# Patient Record
Sex: MALE | Race: WHITE | NOT HISPANIC OR LATINO | Employment: FULL TIME | URBAN - METROPOLITAN AREA
[De-identification: names, ages, dates, MRNs, and addresses within clinical notes are randomized per-mention and may not be internally consistent; named-entity substitution may affect disease eponyms.]

---

## 2017-09-14 DIAGNOSIS — I49.9 CARDIAC ARRHYTHMIA, UNSPECIFIED CARDIAC ARRHYTHMIA TYPE: Primary | ICD-10-CM

## 2017-09-15 ENCOUNTER — OFFICE VISIT (OUTPATIENT)
Dept: CARDIOLOGY | Facility: CLINIC | Age: 47
End: 2017-09-15
Payer: COMMERCIAL

## 2017-09-15 ENCOUNTER — CLINICAL SUPPORT (OUTPATIENT)
Dept: CARDIOLOGY | Facility: CLINIC | Age: 47
End: 2017-09-15
Payer: COMMERCIAL

## 2017-09-15 VITALS
HEART RATE: 96 BPM | SYSTOLIC BLOOD PRESSURE: 140 MMHG | HEIGHT: 71 IN | BODY MASS INDEX: 34.16 KG/M2 | DIASTOLIC BLOOD PRESSURE: 84 MMHG | WEIGHT: 244 LBS

## 2017-09-15 DIAGNOSIS — E66.9 OBESITY (BMI 30.0-34.9): ICD-10-CM

## 2017-09-15 DIAGNOSIS — I48.0 PAF (PAROXYSMAL ATRIAL FIBRILLATION): Primary | ICD-10-CM

## 2017-09-15 DIAGNOSIS — I10 ESSENTIAL HYPERTENSION: ICD-10-CM

## 2017-09-15 DIAGNOSIS — I49.9 CARDIAC ARRHYTHMIA, UNSPECIFIED CARDIAC ARRHYTHMIA TYPE: ICD-10-CM

## 2017-09-15 DIAGNOSIS — F41.9 ANXIETY: ICD-10-CM

## 2017-09-15 PROCEDURE — 93000 ELECTROCARDIOGRAM COMPLETE: CPT | Mod: S$GLB,,, | Performed by: INTERNAL MEDICINE

## 2017-09-15 PROCEDURE — 99999 PR PBB SHADOW E&M-EST. PATIENT-LVL III: CPT | Mod: PBBFAC,,, | Performed by: INTERNAL MEDICINE

## 2017-09-15 PROCEDURE — 3008F BODY MASS INDEX DOCD: CPT | Mod: S$GLB,,, | Performed by: INTERNAL MEDICINE

## 2017-09-15 PROCEDURE — 99205 OFFICE O/P NEW HI 60 MIN: CPT | Mod: S$GLB,,, | Performed by: INTERNAL MEDICINE

## 2017-09-15 RX ORDER — BISOPROLOL FUMARATE AND HYDROCHLOROTHIAZIDE 2.5; 6.25 MG/1; MG/1
1 TABLET ORAL DAILY
Qty: 30 TABLET | Refills: 11 | Status: SHIPPED | OUTPATIENT
Start: 2017-09-15 | End: 2018-07-04 | Stop reason: SDUPTHER

## 2017-09-15 RX ORDER — DILTIAZEM HYDROCHLORIDE 240 MG/1
240 CAPSULE, COATED, EXTENDED RELEASE ORAL DAILY
COMMUNITY
End: 2017-09-15 | Stop reason: SINTOL

## 2017-09-15 NOTE — PROGRESS NOTES
Subjective:   Patient ID:  Gino Arreola is a 47 y.o. male     Chief complaint:Atrial Fibrillation (establish EP- PCP changed Atenolol to Cardizem due to ED and does not like side affects of Cardizem)      HPI  Background :  New patient to me   Self referred   47 man   Had one bout of PAF 10 years ago- self converted. Was placed on atenolol. No issues since then. No Hx prior alcohol intake -- drinks a couple beers a week. At the time was in great athletic shape. Now still gets 73290 steps a day.   He has had a few flutters for a few secs in the past few years  He moved from [place] recently. He has started having ED on atenolol and stopped it early August-- better. He was seen by MD and placed on Diltiazem and his  BP has been high. BP was not Hi on atenolol. He feels poorly on the diltiazem -- he feels as though his heart beat is harder and her feels as though his chest is full of gas.   I have reviewed the actual image of the ECG tracing obtained today and it shows NSR with normal intervals    Current Outpatient Prescriptions   Medication Sig    bisoprolol-hydrochlorothiazide 2.5-6.25 mg (ZIAC) 2.5-6.25 mg Tab Take 1 tablet by mouth once daily.     No current facility-administered medications for this visit.      Review of Systems   Constitution: Negative for decreased appetite, weakness, malaise/fatigue, weight gain and weight loss.   Eyes: Negative for blurred vision.   Cardiovascular: Negative for chest pain, claudication, cyanosis, dyspnea on exertion, irregular heartbeat, leg swelling, near-syncope, orthopnea and palpitations.   Respiratory: Negative for cough, shortness of breath, sleep disturbances due to breathing, snoring and wheezing.    Endocrine: Negative for heat intolerance.   Hematologic/Lymphatic: Does not bruise/bleed easily.   Musculoskeletal: Negative for muscle weakness and myalgias.   Gastrointestinal: Positive for bloating. Negative for melena, nausea and vomiting.   Genitourinary:  Negative for nocturia.   Neurological: Negative for excessive daytime sleepiness, dizziness, headaches and light-headedness.   Psychiatric/Behavioral: Negative for depression, memory loss and substance abuse. The patient does not have insomnia and is not nervous/anxious.        Objective:   Physical Exam   Constitutional: He is oriented to person, place, and time. He appears well-developed and well-nourished.   HENT:   Head: Normocephalic and atraumatic.   Right Ear: External ear normal.   Left Ear: External ear normal.   Eyes: Conjunctivae are normal. Pupils are equal, round, and reactive to light. Right eye exhibits no discharge. Left eye exhibits no discharge. Right conjunctiva is not injected. Left conjunctiva has no hemorrhage.   Neck: Neck supple. No JVD present. No thyromegaly present.   Cardiovascular: Normal rate, regular rhythm, normal heart sounds and intact distal pulses.  PMI is not displaced.  Exam reveals no gallop, no friction rub, no midsystolic click and no opening snap.    No murmur heard.  Pulses:       Carotid pulses are 2+ on the right side, and 2+ on the left side.       Radial pulses are 2+ on the right side, and 2+ on the left side.        Dorsalis pedis pulses are 2+ on the right side, and 2+ on the left side.        Posterior tibial pulses are 2+ on the right side, and 2+ on the left side.   Pulmonary/Chest: Effort normal and breath sounds normal. No respiratory distress. He has no wheezes. He has no rales. He exhibits no tenderness.   Abdominal: Soft. Normal appearance. He exhibits no pulsatile liver. There is no hepatomegaly. There is no tenderness. There is no rebound and no guarding.   Musculoskeletal: Normal range of motion. He exhibits no edema or tenderness.        Right knee: He exhibits no swelling.        Left knee: He exhibits no swelling.        Right ankle: He exhibits no swelling.        Left ankle: He exhibits no swelling.        Right lower leg: He exhibits no swelling.      "   Left lower leg: He exhibits no swelling.        Right foot: There is no swelling.        Left foot: There is no swelling.   Neurological: He is alert and oriented to person, place, and time. He has normal strength and normal reflexes. No cranial nerve deficit. Coordination normal.   Skin: Skin is warm, dry and intact. No rash noted. He is not diaphoretic. No cyanosis.   Psychiatric: He has a normal mood and affect. His behavior is normal.   Nursing note and vitals reviewed.    BP (!) 140/84 (BP Location: Right arm, Patient Position: Sitting)   Pulse 96   Ht 5' 11" (1.803 m)   Wt 110.7 kg (244 lb)   BMI 34.03 kg/m²      Assessment:      1. PAF (paroxysmal atrial fibrillation)    2. Essential hypertension    3. Obesity (BMI 30.0-34.9)    4. Anxiety        Plan:     The patient and I have had a long talk about A Fib, its causes, its clinical manifestations, the electrical and hemodynamic reasons underlying the symptoms it causes, its prognostic implications, its prevalence, its temporal manifestations and its currently available treatment options.     As to causes,         we talked about the triad of obesity, sleep apnea and hypertension as being the most common underpinnings of non valvular AF. To this one should add alcohol consumption, hyperthyroidism and major stressors such as acute illnesses and post operative states as potential significant but usually reversible triggers.    Other important associated factors include aging, SSS and at times a familial/genetic tendency (the latter is invoked in younger people).  Of course, patients with reduced heart function and/or valvular disease (especially mitral regurgitation) are more prone to developing atrial fibrillation. Furthermore, atrial fibrillation and these conditions seem to promote each other in a vicious cycle like pattern (AFib worsens heart failure and MR which in turn promote more A Fib etc).     As to clinical manifestations,        we discussed " the fact that some patients can present with severe symptoms including palpitations (fast and/or irregular), SOB, LR and michael heart failure, dizziness, near syncope and rarely syncope. Others may have milder Sx such as mild intermittent palpitations, fatigue, generalized malaise, sleep disturbances, and simply a poorly defined decrease in well being (including a depressed mood, a decrease in mental agility and maybe an increase in irritability etc). Others yet are apparently completely asymptomatic and are often surprised to note that their pulse is irregular when they self check it or to be told that their ECG shows A Fib. This group is said to comprise approximately 27% of all patients with A Fib ([place] study data), but upon closer scrutiny many so called asymptomatic patients find that indeed they had some A Fib related ill defined Sx that were affecting their lifestyles a lot more than they had suspected (generally, these Sx would be ascribed to being deconditioned, getting old etc but they seem to be reversed once AFib is reverted to normal sinus rhythm (NSR)).    As to why A Fib cause symptoms,       we discussed how the normal heart beat is engineered. To wit, the heart is a contractile pump that cycles through a filling (rest) period followed by a squeezing (active) period. These two consecutive periods form one cardiac cycle that gets continuously repeated. To start a cycle, the pump needs to be electrically supplied/charged. This happens with the help of the sinus node that sets the heart rhythm that is designed to coordinate the smooth transfer of blood flow between the various heart chambers and their attached vessels.  The heart has 4 cardiac chambers: two upper chambers called the right and left atria and two lower chambers called the right and left ventricles. The upper chambers are connected to large veins that carry blood back for cardiac filling. The right upper chamber (right atrium) is  "connected to two veins called vena cava. They carry the de-oxygenated blood back from the lower and upper body to the right atrium (the superior vena cava receives blood back from the head and neck and upper torso including the arms; the inferior vena cava receives blood from the lower body). The L atrium receives re-oxygenated blood from the lungs via four pulmonary veins(PV)  (two R and two L). As we will see later, these PVs are integrally responsible for the development of most A fib events.   The sinus node is located in the area near the attachment of the superior vena cava (SVC) to the right atrium (RA). It is an electrically automatic structure that builds up a charge and releases it after reaching a certain voltage (not unlike a capacitor). This electrical impulse will then be carried by electrical wires to the R and L atria. The signal will then be distributed through a myriad of small connections to the individual muscle cells within the atria. This is a rapid process that will cause the muscles of the upper chambers to contract and therefore squeeze down their internal volumes. As the size of the chambers gets reduced, the internal pressure within their cavity is increased. This is in turn will open the valves that separate the atria from the ventricles (the AV valves: tricuspid valve on the right, mitral valve on the left). Thus starts the filling of the ventricles. This is mostly a passive "rushing" process. When filling of the ventricles becomes substantial enough the pressure amrit within their cavities and the atria then actively squeeze to empty the rest of the blood they contain (this active "atrial kick" is generally responsible for 15 to 25% of ventricular filling but could become a much more important contributor in certain pathological conditions such as diastolic heart failure etc). Once the atrial kick is complete and the atria are virtually empty, the pressure in the ventricles becomes " high enough that the AV valves close up thus sealing the atria from the subsequent ventricular contraction.  As one can suspect, all of this mechanical activity takes a much longer time than the electrical impulse that set it off. Since the ventricular contraction also requires similar electrical activation there needs to be a system of electro-mechanical coupling. This is insured by the AV node which acts as a relay station, a sort of filter that holds on to the electrical impulse just long enough for the atrial contraction to be complete. The electrical impulse is then released into electrical cables (the R and L bundle branches) that in turn deliver the signals to the individual myocardial cells. This sets up the ventricular squeeze and the R ventricle (RV) pushes the blood through the pulmonary artery (PA) into the lungs for re-oxygenation while the L ventricle (LV) pushes the blood into the aorta to be distributed to the rest of the body where the oxygen gets used by the various organs. Once the ventricular systole is complete, the ventricles then start the process of relaxation and their internal pressure drops setting the stage  For a new full cardiac cycle.  This orderly process of filling and emptying, relaxation and contraction is intimately related to the function of the sinus node. In turn the sinus node is controlled by nerve impulses that allow it to speed up or slow down its repetition rate depending on needs (faster heart rates when a person is active, slower rates when sleeping etc).       When A fib occurs, it shuts off the function of the sinus node. Unfortunately, in the presence of A Fib, there are very many (up to 400) small irregular impulse that flow around, bump and compete for conduction across the AV node. Fortunately, only some of these impulse will make through to the ventricles. Nevertheless, this results in an irregular ventricular heart beat that is at times very fast. The fast heart  "beat does not allow the heart enough time to relax and fill and therefore the blood distribution to the body is impaired and the patient may feel palpitations, dizziness, weakness and SOB among other symptoms. The irregular heart beat further impairs the mechanics of the heart squeeze and may increase leaks across the AV valves. Finally, because the atrial electrical signals are many, there is no organized atrial contraction so that filling of the lower chambers is only a passive "sucking" process as the atrial kick cannot occur anymore. As A Fib sets in, the atrial muscle tires, and eventually weakens causing the atrial chambers to gradually enlarge and the blood within them to become more and more stagnant especially in some dependent areas (such as the left atrial appendage- NAY).     The stagnation of blood tends to form blood clots in the NAY (a finger like extension of the LA that has muscular areas and is located in a dependent position). If and when these blood clots get detached from the NAY, they travel into the circulation and may plug up some important arterial blood vessel resulting in organ damage (such as strokes, heart attacks, limb clots etc).    As to prognosis,       due to excess stroke risk and cardiovascular stresses,AF  has been implicated in an overall reduction of expected life span by an estimated average two to three years.  Nevertheless, many individuals can live long, happy and productive lives while in persistent atrial fibrillation. There are in fact an estimated five million people in the [place] alone who have some form of atrial fibrillation.    As to its types and temporal manifestations,       A Fib, is labeled as either paroxysmal (PAF) or persistent Pers AF) or permanent (perm AF).          PAF starts suddenly and stops on its own, usually within less than a week.          Pers AF stays longer than a week but is reversible by medical intervention.             Of course both PAF and " Pers AF can be recurrent and may require repetitive medical interventions.         Perm AF is AF that cannot be converted back to NSR for any meaningful period of time despite any and all medical interventions.             Of course, this definition changes with time as medical advances keep adding newer and more powerful tools designed to keep AF at bay.    As to treatment, there are two related but somewhat independent pathways that need to be followed simultaneously.      First and foremost, one needs to manage and attenuate the risks of stroke that are attendant to AF. This is done largely through the use of blood thinning medications (anticoagulants) namely warfarin (coumadin) or the newer, easier to use, generally more effective and safer (but more expensive) agents. These are Dabigatran (Pradaxa), Rivaroxiban (Xarelto), Apixaban (Eliquis) and Edoxaban (Savaysa). Unfortunately Aspirin alone is ineffective while the combination of Aspirin and Clopidogrel (Plavix) provides at most mild protection (less than warfarin) and does not significantly decrease the risk of associated bleeding.  There are also mechanical solutions that involve occlusion or ligation of the NAY but these are reserved to special cases.   The anticoagulation decisions are made as soon as one is diagnosed with AFib whether it reverts back to NSR or not. In general,, once a decision to give long term anticoagulation is made it is not modified by the specifics of the rhythm treatment of AFib.     Second, one needs to minimize the impact of AF on cardiovascular hemodynamics and function.      There are two approaches to doing that:          One approach, called the rate control strategy, is designed to simply increasing the filtering across the AV node thus slowing the heart rate. This strategy, when combined with effective anticoagulation, is statistically effective in preserving patients' overall health and longevity but is not satisfactory to  everyone. Generally, the following medications are used: Beta Blockers (there are many), Diltiazem and Verapamil. In addition , Digoxin (Lanoxin) can sometimes be useful.   With this strategy, the patient will remain in AF and although the heart rate is slower it remains irregular (so palpitations may persist although more serious symptoms such as SOB, LR and CHF may herbert).  When rate control is not effective despite optimal medical treatment, one can resort to placing a pacemaker and following up with ablation of the AV node. This effectively stops the flow of all electrical activity from the atria to the ventricles.         The second approach, called the rhythm control strategy is designed to restore the NSR. This is theoretically ideal. Unfortunately it can at times be a more difficult task and often relies on more intensive medical and procedural interventions including more powerful (and potentially toxic) medications than the ones used for rate control alone.              The most benign and helpful immediate intervention to terminate AF is electrical cardioversion (DCCV). When well prepared for (appropriate anticoagulation) and well executed (appropriate sedation) this is a very easy, safe and effective procedure that restores sinus rhythm in more than 90% of instances. The weakness of DCCV is that it provides temporary relief and other factors and or treatment measures have to be conjured to keep AF at bay for long periods of time. On the positive side, DCCV can be used repeatedly with seemingly little downside except for the practical disruptions to one's schedule and perhaps eventual futility.             To maintain NSR once initial DCCV fails to provide long term relief, antiarrhythmic drugs (AA Rx) are prescribed. These include type 1A drugs (Quinidine and disopyramide), type 1Cdrugs (flecainide and propafenone) and type III drugs (amiodarone, sotalol, amiodarone, dronedarone and dofetilide). In  addition, Ranozalin, a drug that is approved for angina is sometimes used as an adjunct with amiodarone or dronedarone. Usually, the type 1A agents as well as dofetilide and perhaps sotalol require in hospital initiation to monitor their effects and/or side effects. The other agents are generally started on an outpatient basis. These medications are powerful and require follow up by a cardiology specialist, preferably an electrophysiologist.              When drugs are not effective, poorly tolerated or simply not wanted, ablative procedures can be used. The general goal of ablation is to electrically isolate the pulmonary veins (PVs). In A Fib, the PVs harbor abnormal triggering signals that are responsible for initiation of AF. PV isolation silences or isolates these triggers.                    This can be done either surgically (the MAZE procedure) or via catheter ablation whereby catheters are placed through the veins in the groin and across the interatrial membrane. These are then guided to the pulmonary veins and cauterizing lesions are placed circumferentially outside the PV orifices. Two types of energy are commonly used: radiofrequency energy (hot cautery)  is generally applied point by point via a 3.5 mm tipped catheter while cryoablation (cold cautery) is generally applied by insuflating cold nitrogen into a  balloon expanded against the PV orifice.                    Both catheter mediated techniques have similar risks and success rates. The complication rate is rather low but the potential hazards can lead to serious injury (including but not limited to death, cardiac or great vessel perforation possibly requiring chest surgery, esophageal injury, PV narrowing, injury to the phrenic nerves - the nerves that innervate the diaphragms- , complications anesthesia, kidney injury, heart attack, heart failure and stroke).                     In addition to these downstream (reactive) treatment regimens, one  "needs to also offer "upstream"(preventive) treatments.        These include steps taken to achieve strict control of the conditions that we listed above as contributory causes to AF.       Thus, weight control (including bariatric surgery), sleep apnea management (CPAP etc), abstinence from alcohol intake, BP control, treatment of hyperthyroidism (tapazole, radio-iodine ablation, thyroidectomy), management of heart failure and valvular disease (including surgical correction) are all essential to enhance the effectiveness of AF treatment and control of recurrences.        At this stage, there is no need for any reactive treatment, just upstream preventive Rx -- meaning, control HTN, keep wt at healthy levels, exerc ise moderately, keep alcohol intake to no more than one drink a day.   We will DC Cardizem (side effects) and try a small dose of Ziac for the Rx of HTN -- if has side effects then switch to Eplerenone    Orders Placed This Encounter   Procedures    Basic metabolic panel     Standing Status:   Future     Standing Expiration Date:   9/15/2018    MAGNESIUM     Standing Status:   Future     Standing Expiration Date:   11/14/2018     Return in about 1 year (around 9/15/2018), or if symptoms worsen or fail to improve.  Medications Discontinued During This Encounter   Medication Reason    diltiaZEM (CARDIZEM CD) 240 MG 24 hr capsule Side effects     New Prescriptions    BISOPROLOL-HYDROCHLOROTHIAZIDE 2.5-6.25 MG (ZIAC) 2.5-6.25 MG TAB    Take 1 tablet by mouth once daily.     Modified Medications    No medications on file              "

## 2017-09-23 PROBLEM — I48.0 PAF (PAROXYSMAL ATRIAL FIBRILLATION): Status: ACTIVE | Noted: 2017-09-23

## 2017-09-23 PROBLEM — F41.9 ANXIETY: Status: ACTIVE | Noted: 2017-09-23

## 2017-09-23 PROBLEM — E66.9 OBESITY (BMI 30.0-34.9): Status: ACTIVE | Noted: 2017-09-23

## 2017-09-27 ENCOUNTER — LAB VISIT (OUTPATIENT)
Dept: LAB | Facility: HOSPITAL | Age: 47
End: 2017-09-27
Attending: INTERNAL MEDICINE
Payer: COMMERCIAL

## 2017-09-27 DIAGNOSIS — I10 ESSENTIAL HYPERTENSION: ICD-10-CM

## 2017-09-27 LAB
ANION GAP SERPL CALC-SCNC: 9 MMOL/L
BUN SERPL-MCNC: 16 MG/DL
CALCIUM SERPL-MCNC: 9.3 MG/DL
CHLORIDE SERPL-SCNC: 103 MMOL/L
CO2 SERPL-SCNC: 26 MMOL/L
CREAT SERPL-MCNC: 1 MG/DL
EST. GFR  (AFRICAN AMERICAN): >60 ML/MIN/1.73 M^2
EST. GFR  (NON AFRICAN AMERICAN): >60 ML/MIN/1.73 M^2
GLUCOSE SERPL-MCNC: 92 MG/DL
MAGNESIUM SERPL-MCNC: 2.1 MG/DL
POTASSIUM SERPL-SCNC: 3.9 MMOL/L
SODIUM SERPL-SCNC: 138 MMOL/L

## 2017-09-27 PROCEDURE — 36415 COLL VENOUS BLD VENIPUNCTURE: CPT | Mod: PO

## 2017-09-27 PROCEDURE — 83735 ASSAY OF MAGNESIUM: CPT

## 2017-09-27 PROCEDURE — 80048 BASIC METABOLIC PNL TOTAL CA: CPT

## 2017-10-02 ENCOUNTER — TELEPHONE (OUTPATIENT)
Dept: ELECTROPHYSIOLOGY | Facility: CLINIC | Age: 47
End: 2017-10-02

## 2017-10-02 NOTE — TELEPHONE ENCOUNTER
----- Message from Thad Hamilton MD sent at 10/2/2017  6:34 AM CDT -----  All results are OK. Please see comments below- if any- and call patient.  In general you do not need to route back to me.

## 2018-01-10 NOTE — RESULT NOTES
Verified Results  [facility]) CMP14+eGFR [id number] 09:07AM Austin Fend     Test Name Result Flag Reference   Glucose, Serum 105 mg/dL H 65-99   BUN 18 mg/dL  6-24   Creatinine, Serum 0 95 mg/dL  0 76-1 27   eGFR If NonAfricn Am 96 mL/min/1 73  >59   eGFR If Africn Am 111 mL/min/1 73  >59   BUN/Creatinine Ratio 19  9-20   Sodium, Serum 138 mmol/L  134-144   Potassium, Serum 4 6 mmol/L  3 5-5 2   Chloride, Serum 100 mmol/L     Carbon Dioxide, Total 21 mmol/L  18-29   Calcium, Serum 9 4 mg/dL  8 7-10 2   Protein, Total, Serum 7 0 g/dL  6 0-8 5   Albumin, Serum 4 5 g/dL  3 5-5 5   Globulin, Total 2 5 g/dL  1 5-4 5   A/G Ratio 1 8  1 1-2 5   Bilirubin, Total 0 6 mg/dL  0 0-1 2   Alkaline Phosphatase, S 65 IU/L     AST (SGOT) 23 IU/L  0-40   ALT (SGPT) 29 IU/L  0-44     (LC) Lipid Panel With LDL/HDL Ratio [id number] 09:07AM Jacky Fend     Test Name Result Flag Reference   Cholesterol, Total 226 mg/dL H 100-199   Triglycerides 218 mg/dL H 0-149   HDL Cholesterol 42 mg/dL  >39   According to ATP-III Guidelines, HDL-C >59 mg/dL is considered a  negative risk factor for CHD  VLDL Cholesterol Umberto 44 mg/dL H 5-40   LDL Cholesterol Calc 140 mg/dL H 0-99   LDL/HDL Ratio 3 3 ratio units  0 0-3 6   LDL/HDL Ratio                                                             Men  Women                                               1/2 Avg  Risk  1 0    1 5                                                   Avg Risk  3 6    3 2                                                2X Avg  Risk  6 2    5 0                                                3X Avg  Risk  8 0    6 1     (LC) PSA Total (Reflex To Free) [id number] 09:07AM Austin Fend     Test Name Result Flag Reference   Prostate Specific Ag, Serum 0 5 ng/mL  0 0-4 0   Roche ECLIA methodology  According to the American Urological Association, Serum PSA should  decrease and remain at undetectable levels after radical  prostatectomy   The AUA defines biochemical recurrence as an initial  PSA value 0 2 ng/mL or greater followed by a subsequent confirmatory  PSA value 0 2 ng/mL or greater  Values obtained with different assay methods or kits cannot be used  interchangeably  Results cannot be interpreted as absolute evidence  of the presence or absence of malignant disease  Reflex Criteria Comment     The percent free PSA is performed on a reflex basis only when the  total PSA is between 4 0 and 10 0 ng/mL       [facility]) TSH+Free T4 [id number] 09:07AM Dom Citizen     Test Name Result Flag Reference   TSH 1 770 uIU/mL  0 450-4 500   T4,Free(Direct) 1 38 ng/dL  0 82-1 77       Discussion/Summary   Will discuss labs at follow up appt

## 2018-01-11 NOTE — RESULT NOTES
Verified Results  [facility]) CBC+Platelet+Hem Review [id number] 09:07AM [facility]     Test Name Result Flag Reference   WBC 5 5 x10E3/uL  3 4-10 8   RBC 5 12 x10E6/uL  4 14-5 80   Hemoglobin 15 5 g/dL  12 6-17 7   Hematocrit 45 1 %  37 5-51 0   MCV 88 fL  79-97   MCH 30 3 pg  26 6-33 0   MCHC 34 4 g/dL  31 5-35 7   RDW 13 3 %  12 3-15 4   Platelets 594 O48G9/VV  150-379   Neutrophils 66 %     Lymphs 28 %     Monocytes 4 %     Eos 2 %     Basos 0 %     Neutrophils Absolute 3 6 X10E3/uL  1 4-7 0   Lymphs (Absolute) 1 5 X10E3/uL  0 7-3 1   Monocytes(Absolute) 0 2 X10E3/uL  0 1-0 9   Eos (Absolute Value) 0 1 X10E3/uL  0 0-0 4   Baso(Absolute) 0 0 X10E3/uL  0 0-0 2   RBC Comment RBC's appear normal   Normal   Platelet Comment Comment  Adequate   Platelet count verified by examination of peripheral blood smear  [facility]) Cardiovascular Risk Assessment [id number] 09:07AM [facility]     Test Name Result Flag Reference   Interpretation Note     Supplement report is available  PDF Image            Discussion/Summary   lab acceptable
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
## 2018-07-05 RX ORDER — BISOPROLOL FUMARATE AND HYDROCHLOROTHIAZIDE 2.5; 6.25 MG/1; MG/1
TABLET ORAL
Qty: 30 TABLET | Refills: 3 | Status: SHIPPED | OUTPATIENT
Start: 2018-07-05 | End: 2018-11-07 | Stop reason: SDUPTHER

## 2018-11-07 RX ORDER — BISOPROLOL FUMARATE AND HYDROCHLOROTHIAZIDE 2.5; 6.25 MG/1; MG/1
TABLET ORAL
Qty: 30 TABLET | Refills: 0 | Status: SHIPPED | OUTPATIENT
Start: 2018-11-07 | End: 2018-12-12 | Stop reason: SDUPTHER

## 2018-12-13 RX ORDER — BISOPROLOL FUMARATE AND HYDROCHLOROTHIAZIDE 2.5; 6.25 MG/1; MG/1
TABLET ORAL
Qty: 30 TABLET | Refills: 0 | Status: SHIPPED | OUTPATIENT
Start: 2018-12-13 | End: 2019-01-04 | Stop reason: SDUPTHER

## 2019-01-04 DIAGNOSIS — I48.0 PAF (PAROXYSMAL ATRIAL FIBRILLATION): Primary | ICD-10-CM

## 2019-01-06 RX ORDER — BISOPROLOL FUMARATE AND HYDROCHLOROTHIAZIDE 2.5; 6.25 MG/1; MG/1
1 TABLET ORAL DAILY
Qty: 90 TABLET | Refills: 4 | Status: SHIPPED | OUTPATIENT
Start: 2019-01-06

## 2022-09-09 ENCOUNTER — OFFICE VISIT (OUTPATIENT)
Dept: CARDIOLOGY CLINIC | Facility: CLINIC | Age: 52
End: 2022-09-09
Payer: COMMERCIAL

## 2022-09-09 VITALS
OXYGEN SATURATION: 97 % | HEART RATE: 116 BPM | HEIGHT: 72 IN | DIASTOLIC BLOOD PRESSURE: 60 MMHG | WEIGHT: 243 LBS | TEMPERATURE: 97 F | SYSTOLIC BLOOD PRESSURE: 120 MMHG | BODY MASS INDEX: 32.91 KG/M2

## 2022-09-09 DIAGNOSIS — Z98.890 HISTORY OF CARDIAC RADIOFREQUENCY ABLATION: ICD-10-CM

## 2022-09-09 DIAGNOSIS — I10 HYPERTENSION, UNSPECIFIED TYPE: ICD-10-CM

## 2022-09-09 DIAGNOSIS — R06.83 SNORING: ICD-10-CM

## 2022-09-09 DIAGNOSIS — E78.2 MIXED HYPERLIPIDEMIA: ICD-10-CM

## 2022-09-09 DIAGNOSIS — E66.9 OBESITY (BMI 30-39.9): ICD-10-CM

## 2022-09-09 DIAGNOSIS — I48.0 PAROXYSMAL ATRIAL FIBRILLATION (HCC): ICD-10-CM

## 2022-09-09 PROCEDURE — 93000 ELECTROCARDIOGRAM COMPLETE: CPT | Performed by: INTERNAL MEDICINE

## 2022-09-09 PROCEDURE — 99214 OFFICE O/P EST MOD 30 MIN: CPT | Performed by: INTERNAL MEDICINE

## 2022-09-09 RX ORDER — SILDENAFIL 100 MG/1
100 TABLET, FILM COATED ORAL DAILY PRN
COMMUNITY

## 2022-09-09 RX ORDER — METOPROLOL TARTRATE 50 MG/1
50 TABLET, FILM COATED ORAL EVERY 12 HOURS SCHEDULED
COMMUNITY
End: 2022-09-26 | Stop reason: SDUPTHER

## 2022-09-09 RX ORDER — OLMESARTAN MEDOXOMIL 40 MG/1
40 TABLET ORAL DAILY
COMMUNITY

## 2022-09-09 NOTE — PROGRESS NOTES
Consultation - Cardiology Office  [facility]  Krista Velázquez 46 y o  male MRN: [id number]  : 1970  Unit/Bed#:  Encounter: [id number]      Assessment:     1  Paroxysmal atrial fibrillation (HCC)    2  Hypertension, unspecified type    3  Mixed hyperlipidemia    4  Obesity (BMI 30-39 9)    5  History of atrial fibrillation ablation on 2021    6  Snoring        Discussion summary and Plan:    1  Paroxysmal atrial fibrillation  Currently he is in sinus tachycardia heart rate now has come down to around 90 beats per minute  When he came he was running to our office as he was late as per him  Continue beta-blocker advised him to be compliant with it  Continue Xarelto for antithrombotic therapy  2  Hypertension  Seems to be well controlled with Benicar    3  Dyslipidemia  To better assess his risk for cardiovascular event advised to get yearly cholesterol test     4  Obesity with BMI around 33 and he had atrial fibrillation and snoring he will need a sleep study to rule out sleep apnea    5  Snoring with atrial arrhythmias  Sleep study ordered  Patient will get initially home study  6  Erectile dysfunction  On Viagra as needed    7  Sinus tachycardia on EKG  Most likely due to underlying not taking beta-blocker and patient rushing to our office  Advised him to monitor heart rate if heart rate still elevated more than 100 by tomorrow he will give us a call  He monitors his heart rate by Apple watch regularly  Patient / Melissa Tellezw was advised and educated to call our office  immediately if  patient has any new symptoms of chest pain/shortness of breath, near-syncope, syncope, light headedness sustained palpitations or any other cardiovascular symptoms before their scheduled follow-up appointment  Office number was provided #[phone]  Thank you for your consultation    If you have any question please call me at [phone]    Counseling :  A description of the counseling  Goals and Barriers  Patient's ability to self care: Yes  Medication side effect reviewed with patient in detail and all their questions answered to their satisfaction  Primary Care Physician Requesting Consult: Alissa Lane DO    Reason for Consult / Principal Problem:  Paroxysmal atrial fibrillation and history of hypertension  HPI :     Timothy Gonzales is a 46y o  year old male who was referred by primary care doctor for history of paroxysmal atrial fibrillation hypertension  Patient used to live in [place] and had episode of atrial fibrillation at that time which was suppressed with flecainide he [place] to [place] [address] [address] and started to get later on multiple episodes of atrial fibrillation  He was evaluated by EP in  [facility] and he had EP workup done in the form of ablation in September 2021  Since then he is maintaining sinus rhythm  Occasionally still get episodes of fast heartbeat  He has been on Xarelto and metoprolol 50 twice a day  He was recommended sleep study he has not done it yet  He did develop erectile dysfunction with beta-blocker has been taking Viagra as needed  Sometime he misses beta-blocker  Today he was rushing to our office his heart rate on arrival was found to be elevated around 160 beats per minute  His previous cardiac workup reviewed  And note from EP and other note reviewed  He had normal LV systolic function  He had a previously stress echo which was normal and CTA for coronary was normal   His palpitations which he gets occasionally was thought to be PACs and PVCs and they have ordered monitor for him which he was done recently  He denies any nausea and vomiting he does snore  He does not drink much  No other cardiovascular issues  Review of Systems   Constitutional: Negative for activity change, chills, diaphoresis, fever and unexpected weight change  HENT: Negative for congestion  Eyes: Negative for discharge and redness  Respiratory: Negative for cough, chest tightness, shortness of breath and wheezing  Cardiovascular: Positive for palpitations  Negative for chest pain and leg swelling  Gastrointestinal: Negative for abdominal pain, diarrhea and nausea  Endocrine: Negative  Genitourinary: Negative for decreased urine volume and urgency  Musculoskeletal: Negative  Negative for arthralgias, back pain and gait problem  Skin: Negative for rash and wound  Allergic/Immunologic: Negative  Neurological: Negative for dizziness, seizures, syncope, weakness, light-headedness and headaches  Hematological: Negative  Psychiatric/Behavioral: Positive for sleep disturbance  Negative for agitation and confusion  The patient is not nervous/anxious          Historical Information   Past Medical History:   Diagnosis Date    Atrial fibrillation ([facility] [address] )     HTN (hypertension)      Past Surgical History:   Procedure Laterality Date    CARDIAC ELECTROPHYSIOLOGY STUDY AND ABLATION      CLAVICLE SURGERY Left      Social History     Substance and Sexual Activity   Alcohol Use Yes    Alcohol/week: 1 0 standard drink    Types: 1 Cans of beer per week     Social History     Substance and Sexual Activity   Drug Use Never     Social History     Tobacco Use   Smoking Status Former Smoker    Quit date: 200    Years since quittin 7   Smokeless Tobacco Never Used     Family History:   Family History   Problem Relation Age of Onset    Atrial fibrillation Father        Meds/Allergies     No Known Allergies    Current Outpatient Medications:     metoprolol tartrate (LOPRESSOR) 50 mg tablet, Take 50 mg by mouth every 12 (twelve) hours, Disp: , Rfl:     olmesartan (BENICAR) 40 mg tablet, Take 40 mg by mouth daily, Disp: , Rfl:     rivaroxaban (Xarelto) 20 mg tablet, Take 20 mg by mouth, Disp: , Rfl:     sildenafil (VIAGRA) 100 mg tablet, Take 100 mg by mouth daily as needed for erectile dysfunction, Disp: , Rfl:     Vitals: Blood pressure 120/60, pulse (!) 116, temperature (!) 97 °F (36 1 °C), height 5' 11 5" (1 816 m), weight 110 kg (243 lb), SpO2 97 %  ?  Body mass index is 33 42 kg/m²  Wt Readings from Last 3 Encounters:   09/09/22 110 kg (243 lb)   04/28/16 118 kg (260 lb)   03/22/16 117 kg (257 lb)     Vitals:    09/09/22 1436   Weight: 110 kg (243 lb)     BP Readings from Last 3 Encounters:   09/09/22 120/60   04/28/16 126/80   03/22/16 124/80         Physical Exam    Neurologic:  Alert & oriented x 3, no new focal deficits, Not in any acute distress,  Constitutional:  Adequate built, non-toxic appearance   Neck: Normal range of motion, no tenderness,  Neck supple   Respiratory:  Bilateral air entry, mostly clear to auscultation  Cardiovascular: S1-S2 regular with a 2/6 ejection systolic murmur and S4 is present  GI:  Soft, nondistended,  nontender, no hepatosplenomegaly appreciated  Musculoskeletal:  No edema, no tenderness, no deformities  Skin:  Well hydrated, no rash   Extremities:  No edema and distal pulses are present  Psychiatric:  Speech and behavior appropriate     Diagnostic Studies Review Cardio:  Echocardiogram done in [facility] on 07/19/2021 was read as EF 65%  With exercise EF improved to more than 70%  Low risk treadmill exercise echocardiogram as per report  NATALIE done 09/08/2021 shows patient has no left atrial appendage thrombus  EF was normal   Interatrial septum was intact  No significant valvular disease was mention and ascending and descending aorta was free from atherosclerosis as per report  Transthoracic echo 09/08/2021 shows EF 65%, no significant valvular disease  EKG:  Twelve lead EKG done 09/09/2022 shows sinus tachycardia heart rate 116 beats per minute  As per patient this is generally ablation as he was running late and he was running to our office generally heart rate is around 80 to 90s      Lab Review   Lab Results   Component Value Date    WBC 5 5 03/18/2016 HGB 15 5 03/18/2016    HCT 45 1 03/18/2016    MCV 88 03/18/2016    RDW 13 3 03/18/2016     03/18/2016     BMP:  Lab Results   Component Value Date    K 4 6 03/18/2016     03/18/2016    CO2 21 03/18/2016    BUN 18 03/18/2016    CREATININE 0 95 03/18/2016    CALCIUM 9 4 03/18/2016     Troponins:    LFT:  Lab Results   Component Value Date    AST 23 03/18/2016    ALT 29 03/18/2016    ALKPHOS 65 03/18/2016    ALB 4 5 03/18/2016      Lab Results   Component Value Date    OET6YJJJJVDA 1 770 03/18/2016     Lipid Profile:   Lab Results   Component Value Date    HDL 42 03/18/2016    LDLCALC 140 (H) 03/18/2016    TRIG 218 (H) 03/18/2016           Dr Ji Lehman MD [address] [facility]      "This note was completed in part utilizing m-modal fluency direct voice recognition software  Grammatical errors, random word insertion, spelling mistakes, and incomplete sentences may be an occasional consequence of the system secondary to software limitations, ambient noise and hardware issues  Please read the chart carefully and recognize, using context, where substitutions have occurred    If you have any questions or concerns about the context, text or information contained within the body of this dictation, please contact myself, the provider, for further clarification "

## 2022-09-12 ENCOUNTER — TELEPHONE (OUTPATIENT)
Dept: ADMINISTRATIVE | Facility: OTHER | Age: 52
End: 2022-09-12

## 2022-09-12 ENCOUNTER — TELEPHONE (OUTPATIENT)
Dept: CARDIOLOGY CLINIC | Facility: CLINIC | Age: 52
End: 2022-09-12

## 2022-09-12 NOTE — TELEPHONE ENCOUNTER
----- Message from Katerin Mayers sent at 9/9/2022  2:44 PM EDT -----  09/09/22 2:44 PM     Hello, our patient Kya George had a Colonoscopy  completed/performed  Please assist in updating the patient chart by making an External outreach to [address] [place] [address] in [facility]   Thank you,   Harper Samano MA   No information on file

## 2022-09-12 NOTE — TELEPHONE ENCOUNTER
Upon review of the In Basket request and the patient's chart, initial outreach has been made via fax, please see Contacts section for details       Thank you  Ivory Duque MA

## 2022-09-12 NOTE — LETTER
Procedure Request Form: Colonoscopy      Date Requested: 09/15/22  Patient: Cassie Dobsonnce  Patient : 1970   Referring Provider: Isaias Angel, DO        Date of Procedure ______________________________       The above patient has informed us that they have completed their   most recent Colonoscopy at your facility  Please complete   this form and attach all corresponding procedure reports/results  Comments __________________________________________________________  ____________________________________________________________________  ____________________________________________________________________  ____________________________________________________________________    Facility Completing Procedure _________________________________________    Form Completed By (print name) _______________________________________      Signature __________________________________________________________      These reports are needed for  compliance  Please fax this completed form and a copy of the procedure report to our office located at [place] [address] as soon as possible to [phone] attention Stephanie: Phone [phone]    We thank you for your assistance in treating our mutual patient

## 2022-09-12 NOTE — TELEPHONE ENCOUNTER
Patient called asking which OTC sleep aids he can take that will not interact with his current meds/ condition

## 2022-09-12 NOTE — TELEPHONE ENCOUNTER
There is no interaction  Generally we let the medical doctors decide on sleeping pills  Patient is better of seeing medical MD and deciding taking sleeping pill if he needs 1

## 2022-09-12 NOTE — LETTER
Procedure Request Form: Colonoscopy      Date Requested: 22  Patient: Nanine Ramirez  Patient : 1970   Referring Provider: Akin Hy, DO        Date of Procedure ______________________________       The above patient has informed us that they have completed their   most recent Colonoscopy at your facility  Please complete   this form and attach all corresponding procedure reports/results  Comments __________________________________________________________  ____________________________________________________________________  ____________________________________________________________________  ____________________________________________________________________    Facility Completing Procedure _________________________________________    Form Completed By (print name) _______________________________________      Signature __________________________________________________________      These reports are needed for  compliance  Please fax this completed form and a copy of the procedure report to our office located at [place] [address] as soon as possible to [phone] veronica Brown: Phone [phone]    We thank you for your assistance in treating our mutual patient

## 2022-09-15 ENCOUNTER — TELEPHONE (OUTPATIENT)
Dept: SLEEP CENTER | Facility: CLINIC | Age: 52
End: 2022-09-15

## 2022-09-15 NOTE — TELEPHONE ENCOUNTER
As a follow-up, a second attempt has been made for outreach via fax, please see Contacts section for details      Thank you  Ivory Duque MA

## 2022-09-15 NOTE — TELEPHONE ENCOUNTER
----- Message from Justin Varela MD sent at 9/14/2022  1:37 PM EDT -----  Approved    ----- Message -----  From: Janie Uriostegui  Sent: 5/11/4683   8:49 AM EDT  To: Sleep Medicine Trice Silva Provider    This home sleep study needs approval      If approved please sign and return to clerical pool  If denied please include reasons why  Also provide alternative testing if warranted  Please sign and return to clerical pool

## 2022-09-20 NOTE — TELEPHONE ENCOUNTER
As a final attempt, a third outreach has been made via telephone call  Please see Contacts section for details  This encounter will be closed and completed by end of day  Should we receive the requested information because of previous outreach attempts, the requested patient's chart will be updated appropriately       Thank you  Kaylan Regan MA

## 2022-09-26 DIAGNOSIS — I48.91 ATRIAL FIBRILLATION, UNSPECIFIED TYPE (HCC): Primary | ICD-10-CM

## 2022-09-26 DIAGNOSIS — I10 HYPERTENSION, UNSPECIFIED TYPE: ICD-10-CM

## 2022-09-26 RX ORDER — METOPROLOL TARTRATE 50 MG/1
50 TABLET, FILM COATED ORAL EVERY 12 HOURS SCHEDULED
Qty: 180 TABLET | Refills: 3 | Status: SHIPPED | OUTPATIENT
Start: 2022-09-26

## 2022-10-04 ENCOUNTER — OFFICE VISIT (OUTPATIENT)
Dept: FAMILY MEDICINE CLINIC | Facility: CLINIC | Age: 52
End: 2022-10-04
Payer: COMMERCIAL

## 2022-10-04 VITALS
RESPIRATION RATE: 18 BRPM | SYSTOLIC BLOOD PRESSURE: 120 MMHG | HEART RATE: 86 BPM | DIASTOLIC BLOOD PRESSURE: 70 MMHG | OXYGEN SATURATION: 98 % | WEIGHT: 245 LBS | HEIGHT: 72 IN | BODY MASS INDEX: 33.18 KG/M2 | TEMPERATURE: 97.4 F

## 2022-10-04 DIAGNOSIS — M25.562 LEFT KNEE PAIN, UNSPECIFIED CHRONICITY: Primary | ICD-10-CM

## 2022-10-04 PROCEDURE — 99213 OFFICE O/P EST LOW 20 MIN: CPT | Performed by: FAMILY MEDICINE

## 2022-10-04 NOTE — PROGRESS NOTES
Name: Vida Blackburn      : 1970      MRN: [id number]  Encounter Provider: Raul Palumbo MD  Encounter Date: 10/4/2022   Encounter department: [address]     1  Left knee pain, unspecified chronicity  Comments:  Counseled on rest, ice to the area, ACE wrap, tylenol, elevation  F/u with orthopedics  Orders:  -     Ambulatory Referral to Orthopedic Surgery; Future      Depression Screening and Follow-up Plan: Patient was screened for depression during today's encounter  They screened negative with a PHQ-2 score of 0  Subjective      Knee Pain   There was no injury mechanism (started 2 months ago)  The pain is present in the left knee  The quality of the pain is described as aching and shooting  Pain scale: usually between 7-8/10 intensity  once was 10/10  Pain course: only when he walks  Pertinent negatives include no inability to bear weight, loss of motion, loss of sensation, muscle weakness, numbness or tingling  The symptoms are aggravated by movement and weight bearing  He has tried nothing for the symptoms  The treatment provided no relief  Review of Systems   Constitutional: Negative  HENT: Negative  Eyes: Negative  Respiratory: Negative  Cardiovascular: Negative  Gastrointestinal: Negative  Endocrine: Negative  Genitourinary: Negative  Musculoskeletal: Negative  Left knee pain   Neurological: Negative  Negative for tingling and numbness  Hematological: Negative  Psychiatric/Behavioral: Negative          Current Outpatient Medications on File Prior to Visit   Medication Sig    metoprolol tartrate (LOPRESSOR) 50 mg tablet Take 1 tablet (50 mg total) by mouth every 12 (twelve) hours    olmesartan (BENICAR) 40 mg tablet Take 40 mg by mouth daily    rivaroxaban (XARELTO) 20 mg tablet Take 20 mg by mouth    sildenafil (VIAGRA) 100 mg tablet Take 100 mg by mouth daily as needed for erectile dysfunction       Objective /70   Pulse 86   Temp (!) 97 4 °F (36 3 °C)   Resp 18   Ht 5' 11 5" (1 816 m)   Wt 111 kg (245 lb)   SpO2 98%   BMI 33 69 kg/m²     Physical Exam  Constitutional:       General: He is not in acute distress  Appearance: He is well-developed  He is not diaphoretic  HENT:      Head: Normocephalic and atraumatic  Eyes:      General: No scleral icterus  Right eye: No discharge  Left eye: No discharge  Conjunctiva/sclera: Conjunctivae normal    Pulmonary:      Effort: Pulmonary effort is normal    Musculoskeletal:      Cervical back: Normal range of motion  Left hip: Normal       Left upper leg: Normal       Left knee: No swelling, deformity, effusion, erythema, ecchymosis or lacerations  Decreased range of motion  Tenderness present over the lateral joint line and patellar tendon  Skin:     General: Skin is warm  Neurological:      Mental Status: He is alert and oriented to person, place, and time  Psychiatric:         Behavior: Behavior normal          Thought Content:  Thought content normal          Judgment: Judgment normal        Agnieszka Gonzalez MD

## 2022-10-11 ENCOUNTER — APPOINTMENT (OUTPATIENT)
Dept: RADIOLOGY | Facility: CLINIC | Age: 52
End: 2022-10-11
Payer: COMMERCIAL

## 2022-10-11 ENCOUNTER — OFFICE VISIT (OUTPATIENT)
Dept: OBGYN CLINIC | Facility: CLINIC | Age: 52
End: 2022-10-11
Payer: COMMERCIAL

## 2022-10-11 VITALS
HEIGHT: 72 IN | SYSTOLIC BLOOD PRESSURE: 118 MMHG | HEART RATE: 70 BPM | DIASTOLIC BLOOD PRESSURE: 78 MMHG | WEIGHT: 250 LBS | BODY MASS INDEX: 33.86 KG/M2

## 2022-10-11 DIAGNOSIS — M25.562 ACUTE PAIN OF LEFT KNEE: Primary | ICD-10-CM

## 2022-10-11 DIAGNOSIS — M25.562 ACUTE PAIN OF LEFT KNEE: ICD-10-CM

## 2022-10-11 DIAGNOSIS — M22.42 CHONDROMALACIA OF LEFT PATELLOFEMORAL JOINT: ICD-10-CM

## 2022-10-11 PROCEDURE — 73562 X-RAY EXAM OF KNEE 3: CPT

## 2022-10-11 PROCEDURE — 99203 OFFICE O/P NEW LOW 30 MIN: CPT | Performed by: PHYSICIAN ASSISTANT

## 2022-10-11 NOTE — PATIENT INSTRUCTIONS
Patellofemoral Pain Syndrome   WHAT YOU NEED TO KNOW:   Patellofemoral pain syndrome (PFPS) is pain in or around your patella (kneecap)  PFPS is also called runner's knee or jumper's knee and is common in athletes  Rest, ice, and elevate your knee  You may need tape or brace to support your kneecap  Wear shoe inserts as directed and go to physical therapy  DISCHARGE INSTRUCTIONS:   Call your doctor if:   You have trouble walking  You have a fever  Your knee brace or sleeve is too tight  Your symptoms are not getting better, or they get worse  Your pain and swelling increase even after you take pain medicine  You have questions or concerns about your condition or care  Manage your symptoms:       Change your activity  You may need to rest your knee  You may need to change your exercise routine to low-impact activities  Apply ice  on your knee for 15 to 20 minutes every hour or as directed  Use an ice pack, or put crushed ice in a plastic bag  Cover it with a towel before you apply it  Ice helps prevent tissue damage and decreases swelling and pain  Elevate  your knee above the level of your heart as often as you can  This will help decrease swelling and pain  Prop your leg on pillows or blankets to keep it elevated comfortably  Do not  put a pillow directly under your knee  Support  your knee by wrapping it with tape or an elastic bandage  You may need a brace for more support  This will help decrease swelling and keep your kneecap in the correct spot  Wear shoe inserts as directed  Orthotics or arch supports help keep your foot and ankle stable and in line to decrease stress on your knee  Go to physical therapy as directed  A physical therapist will teach you exercises to help improve movement and strength, and to decrease pain  Maintain a healthy weight  Ask your healthcare provider what a healthy weight is for you   Ask him or her to help you create a weight loss plan if you are overweight  Weight loss can help decrease pressure on your knee  Medicines: You may need the following:  Acetaminophen  decreases pain and fever  It is available without a doctor's order  Ask how much to take and how often to take it  Follow directions  Read the labels of all other medicines you are using to see if they also contain acetaminophen, or ask your doctor or pharmacist  Acetaminophen can cause liver damage if not taken correctly  Do not use more than 4 grams (4,000 milligrams) total of acetaminophen in one day  NSAIDs , such as ibuprofen, help decrease swelling, pain, and fever  This medicine is available with or without a doctor's order  NSAIDs can cause stomach bleeding or kidney problems in certain people  If you take blood thinner medicine, always ask your healthcare provider if NSAIDs are safe for you  Always read the medicine label and follow directions  Take your medicine as directed  Contact your healthcare provider if you think your medicine is not helping or if you have side effects  Tell him or her if you are allergic to any medicine  Keep a list of the medicines, vitamins, and herbs you take  Include the amounts, and when and why you take them  Bring the list or the pill bottles to follow-up visits  Carry your medicine list with you in case of an emergency  Prevent another episode:   Wear the right shoes for your activities  Increase activity gradually  Warm up before you exercise  Stretch your leg muscles before and after activity  Follow up with your doctor as directed: You may be referred to an orthopedic surgeon  Write down your questions so you remember to ask them during your visits  © Copyright [employer] 2022 Information is for End User's use only and may not be sold, redistributed or otherwise used for commercial purposes   All illustrations and images included in CareNotes® are the copyrighted property of [employer]  or [employer]  The above information is an  only  It is not intended as medical advice for individual conditions or treatments  Talk to your doctor, nurse or pharmacist before following any medical regimen to see if it is safe and effective for you

## 2022-10-11 NOTE — PROGRESS NOTES
Assessment/Plan:  1  Acute pain of left knee  XR knee 3 vw left non injury    Ambulatory Referral to Physical Therapy   2  Chondromalacia of left patellofemoral joint  Ambulatory Referral to Orthopedic Surgery    Ambulatory Referral to Physical Therapy       Carlota Madrid is developed left patellofemoral syndrome without trauma  His anterior knee symptoms will be treated with formal physical therapy and icing  We cannot use oral NSAIDs due to concurrent Xarelto use  Bracing is not recommended at this time  He will ice the knee for comfort 20 minutes on 1 hour off 3 times a day and recheck in 6 weeks for repeat evaluation if not improved with conservative care  Subjective:   Patient ID: Bismark Winter is a 46 y o  male with left knee pain for approximately 2 weeks  Patient does not have any specific trauma  He reports the pain is located in the anterior aspect of the knee specific laterally  He reports a popping underneath the kneecap at times  He reports the symptoms are aggravated by going up and down stairs or sitting for long periods of time  He denies any mechanical catching or clicking in the knee  He reports on and off swelling  He reports he has been advised by his cardiologist not to use Tylenol or ibuprofen due to the Xarelto use  He has a contributing medical history of AFib and is on Xarelto chronically  He discussed the symptoms with his PCP and was advised to ice rest use an Ace wrap  Review of Systems   Constitutional: Negative for chills and fever  HENT: Negative for congestion and rhinorrhea  Eyes: Negative for discharge and redness  Respiratory: Negative for cough and shortness of breath  Cardiovascular: Negative for chest pain and leg swelling  Gastrointestinal: Negative for abdominal distention and nausea  Neurological: Negative for dizziness and facial asymmetry  Psychiatric/Behavioral: Negative for confusion and hallucinations     All other systems reviewed and are negative  Past Medical History:   Diagnosis Date   • Atrial fibrillation ([facility] [address] )    • HTN (hypertension)        Past Surgical History:   Procedure Laterality Date   • CARDIAC ELECTROPHYSIOLOGY STUDY AND ABLATION     • CLAVICLE SURGERY Left        Family History   Problem Relation Age of Onset   • Atrial fibrillation Father        Social History     Occupational History   • Not on file   Tobacco Use   • Smoking status: Former Smoker     Quit date:      Years since quittin 7   • Smokeless tobacco: Never Used   Vaping Use   • Vaping Use: Never used   Substance and Sexual Activity   • Alcohol use: Yes     Alcohol/week: 1 0 standard drink     Types: 1 Cans of beer per week     Comment: few weekly   • Drug use: Never   • Sexual activity: Not on file         Current Outpatient Medications:   •  metoprolol tartrate (LOPRESSOR) 50 mg tablet, Take 1 tablet (50 mg total) by mouth every 12 (twelve) hours, Disp: 180 tablet, Rfl: 3  •  olmesartan (BENICAR) 40 mg tablet, Take 40 mg by mouth daily, Disp: , Rfl:   •  rivaroxaban (XARELTO) 20 mg tablet, Take 20 mg by mouth, Disp: , Rfl:   •  sildenafil (VIAGRA) 100 mg tablet, Take 100 mg by mouth daily as needed for erectile dysfunction, Disp: , Rfl:     No Known Allergies    Objective:  Vitals:    10/11/22 1519   BP: 118/78   Pulse: 70       Ortho Exam    Physical Exam  Constitutional:       General: He is not in acute distress  Appearance: Normal appearance  HENT:      Head: Normocephalic and atraumatic  Nose: Nose normal    Cardiovascular:      Rate and Rhythm: Normal rate  Pulses: Normal pulses  Pulmonary:      Effort: Pulmonary effort is normal  No respiratory distress  Breath sounds: No wheezing  Skin:     General: Skin is warm and dry  Coloration: Skin is not jaundiced  Findings: No erythema or rash  Neurological:      General: No focal deficit present        Mental Status: He is alert and oriented to person, place, and time    Psychiatric:         Mood and Affect: Mood normal          Behavior: Behavior normal          Thought Content: Thought content normal          Judgment: Judgment normal      Patient's left knee is without intra-articular effusion  He is nontender on the medial and lateral joint line  He has a negative medial and lateral Kimmie's  He has no pain or instability to valgus and varus stressing  He has a negative Lachman's and posterior drawer  He has no calf tenderness and a negative Homans  He is tender to palpation on the lateral border of the patella with positive patellar apprehension  He has moderate retropatellar crepitus throughout a full extension and flexion to 130°  Quad and hamstring strength are decreased to 4/5  His gait is heel-to-toe  I have personally reviewed pertinent films in PACS and my interpretation is left knee three views show mediolateral joint spaces to be well maintained  No acute fractures or dislocations are seen  Patella is slightly lateral tracking  With mild degenerative changes noted laterally  This was read from an orthopedic standpoint will await official radiologist interpretation

## 2022-10-17 ENCOUNTER — EVALUATION (OUTPATIENT)
Dept: PHYSICAL THERAPY | Facility: CLINIC | Age: 52
End: 2022-10-17
Payer: COMMERCIAL

## 2022-10-17 DIAGNOSIS — M22.42 CHONDROMALACIA OF LEFT PATELLOFEMORAL JOINT: ICD-10-CM

## 2022-10-17 DIAGNOSIS — M25.562 ACUTE PAIN OF LEFT KNEE: ICD-10-CM

## 2022-10-17 PROCEDURE — 97162 PT EVAL MOD COMPLEX 30 MIN: CPT

## 2022-10-17 NOTE — PROGRESS NOTES
PT Evaluation     Today's date: 10/17/2022  Patient name: Kaden Simon  : 1970  MRN: [id number]  Referring provider: GABBY Diaz*  Dx:   Encounter Diagnosis     ICD-10-CM    1  Acute pain of left knee  M25 562 Ambulatory Referral to Physical Therapy   2  Chondromalacia of left patellofemoral joint  M22 42 Ambulatory Referral to Physical Therapy       Start Time:   Stop Time:   Total time in clinic (min): 45 minutes    Assessment  Assessment details: Kaden Simon is a 46 y o  male who presents with pain, decreased strength, decreased ROM, decreased joint mobility, joint effusion and ambulatory dysfunction  Due to these impairments, patient has difficulty performing ADL's, recreational activities, ambulation, stair negotiation, transfers  Patient's clinical presentation is consistent with their referring diagnosis of Acute pain of left knee and Chondromalacia of left patellofemoral joint, with unusual presentation of varied symptoms including onset of sharp pain in lateral L knee just below patella when just sitting reported  Patient appears to respond positively to repeated extension in lying, with patient to trial additional repetitions throughout the day to determine more specific effect  Continued tenderness at lateral border of L patella  Patient has been educated in weight bearing status and home exercise program  Patient would benefit from skilled physical therapy services to address their aforementioned functional limitations and progress towards prior level of function and independence with home exercise program      Impairments: abnormal gait, abnormal or restricted ROM, activity intolerance, impaired physical strength, lacks appropriate home exercise program, pain with function, weight-bearing intolerance, poor posture  and poor body mechanics  Understanding of Dx/Px/POC: good   Prognosis: good    Goals  Short Term Goals: Target Date 4 weeks (22)  1   Independent with initial HEP  2  Increase L knee flexion to 125 degrees without pain for improved mobility  3  Ascend/descend 6" step with railing with fair control, decreased pain for increasing control of knee  4  Increased L knee strength to 4+/5 for readiness for LTG's  Long Term Goals: Target Date 12 weeks (23)  1  Indep with comprehensive HEP for stretching, strengthening, and core stabilization  2  Pt to tolerate reciprocal stairs w/o handrail w/o pain  3  Pt to tolerate prolonged walking on uneven terrain w/o c/o   4  Improve balance such that pt can maintain R/L SLS x 20"  5  Pt to tolerate prolonged/static squat x 15" w/o c/o for completion of household tasks  Plan  Plan details:     Patient would benefit from: PT eval and skilled physical therapy  Planned modality interventions: cryotherapy, thermotherapy: hydrocollator packs and unattended electrical stimulation  Planned therapy interventions: manual therapy, neuromuscular re-education, therapeutic activities, therapeutic exercise, home exercise program, patient education, stretching, functional ROM exercises and balance/weight bearing training  Frequency: 2x week  Duration in weeks: 12  Plan of Care beginning date: 10/17/2022  Plan of Care expiration date: 2023  Treatment plan discussed with: patient        Subjective Evaluation    History of Present Illness  Mechanism of injury: Patient reporting sudden onset of pain a few months ago, without precipitating event  Patient notes worsening of knee pain over the past several weeks, causing him to seek medical attention  Referred to physical therapy at this time, with patellofemoral pain identified as primary concern  X-rays showed lateral shift of patella in patellar groove     Pain  Current pain ratin (varies by day, highly fluctuating)  At best pain ratin  At worst pain rating: 10  Location: L lateral knee around patella, especially inferior aspect  Quality: dull ache and sharp (Sharp pain under knee with stairs)  Aggravating factors: stair climbing and walking  Progression: worsening    Social Support  Steps to enter house: yes  3  Stairs in house: yes   12  Lives in: multiple-level home  Lives with: spouse (two kids 15 & 25)    Employment status: working  Exercise history: walks nightly with his wife, was doing a couple miles prior to moving to [place] about a month ago      Diagnostic Tests  X-ray: abnormal  Treatments  Current treatment: physical therapy  Patient Goals  Patient goals for therapy: decreased pain and return to sport/leisure activities  Patient goal: to walk without knee pain and knee popping  Objective  ROM:   L  R     10/17/22 10/17/22  Knee flexion (prone) 112  123  Knee flexion supine 119*   132  Knee extension -4/0  0      MMT:    L  R  Hip flexion(SLR) 4-/5*  5/5  Hip abduction   4+/5  5/5  Hip extension  4/5*  4+/5  Knee flexion (prone)   4-/5*  4+/5  Knee extension 4-/5*  4+/5    Observation/Palpation:   10/17/22: Patient noted to have lateral glide of left patella compared to R side  Significant tenderness over lateral aspect of patella, with medial glide using kinesiotape providing no significant change initially  Lumbar Screening:   Flexion: Min limitation  Extension: min-mod limitation  Sideglide L/R not assessed today    Mechanical Assessment:  Repeated extension in lying: Decreased sense of popping in knee after 10 repetitions, after two additional sets, noted "it feels different (in a positive way)  Instructed in REIL for home carryover, with emphasis on completing every 1-2 hours and especially if he is having symptoms of pain in left lateral knee             Precautions: standard, lumbar  Daily Exercise Log:    Date 10/17/22        Visit # 1                 Manual         Medial patellar glide  kinesiotape trialed, no sig change                                   Neuro Re-Ed         Bridging w/ feet on ball         clamshells         NMES to VMO         Step-up fwd         Step-up lateral                           Ther Ex         Prone press-up         Prone quad stretch w/ SOS         SLR w/ hip ER         Fig 4 stretch         Quad stretch         LAQ w/ add                                                      Ther Activity                           Gait Training                           Modalities                           HEP:   Access Code: [email or web address]  URL: [email or web address]  Date: 10/17/2022  Prepared by: Amalia Heller    Exercises  · Prone Press Up - 7 x weekly - 2 sets - 10 reps  · Standing Lumbar Extension with Counter - 1 x daily - 7 x weekly - 3 sets - 10 reps  · Standing Lumbar Extension at [address] - 1 x daily - 7 x weekly - 3 sets - 10 reps

## 2022-10-20 ENCOUNTER — OFFICE VISIT (OUTPATIENT)
Dept: PHYSICAL THERAPY | Facility: CLINIC | Age: 52
End: 2022-10-20
Payer: COMMERCIAL

## 2022-10-20 DIAGNOSIS — M25.562 ACUTE PAIN OF LEFT KNEE: Primary | ICD-10-CM

## 2022-10-20 DIAGNOSIS — M22.42 CHONDROMALACIA OF LEFT PATELLOFEMORAL JOINT: ICD-10-CM

## 2022-10-20 PROCEDURE — 97110 THERAPEUTIC EXERCISES: CPT

## 2022-10-20 PROCEDURE — 97112 NEUROMUSCULAR REEDUCATION: CPT

## 2022-10-20 NOTE — PROGRESS NOTES
Daily Note     Today's date: 10/20/2022  Patient name: Vida Blackburn  : 1970  MRN: [id number]  Referring provider: GABBY Shen*  Dx:   Encounter Diagnosis     ICD-10-CM    1  Acute pain of left knee  M25 562    2  Chondromalacia of left patellofemoral joint  M22 42                 Subjective: pt reports he has been very complaint with his REIL/KAYCEE over the last few days and feels this has made a significant difference in his pain, hasn't had any pain in almost a day and a half  The taping seemed to help also  He cont to get intermittent popping while walking       Objective: See treatment diary below      Assessment: Tolerated treatment well  Pt challenged with VMO strengthening  Pt edu to cont with compliance of REIL/KAYCEE due to noted relief at this point  Edu it is certainly possibly to be a back component and also a separate knee component and can be treated as so  Patient would benefit from continued PT      Plan: Continue per plan of care        Precautions: standard, lumbar  Daily Exercise Log:    Date 10/17/22 10/20/2022       Visit # 1 2                Manual         Medial patellar glide  kinesiotape trialed, no sig change leuko tape RR                                   Neuro Re-Ed  20'        Bridging w/ feet on ball  3" 1x10        Bridges w/ ball add   5" 2x10        Wall sits w/ ball add   20"x3        clamshells         NMES to VMO         Step-up fwd         Step-up lateral         Supine sciatic NG  2x10 L                 Ther Ex  25'       Prone press-up         Prone quad stretch w/ SOS  Supine quad stretch over edge w/ sos 20"x3        SLR flex w/ hip ER  2x10        SL hip add L         Fig 4 stretch  20"x3 R/L        Quad stretch         LAQ w/ add  5" 2x10                                                     Ther Activity                           Gait Training                           Modalities                           HEP:   Access Code: [email or web address]  URL: [email or web address]  Date: 10/17/2022  Prepared by: Jarred Quiñones    Exercises  · Prone Press Up - 7 x weekly - 2 sets - 10 reps  · Standing Lumbar Extension with Counter - 1 x daily - 7 x weekly - 3 sets - 10 reps  · Standing Lumbar Extension at [address] - 1 x daily - 7 x weekly - 3 sets - 10 reps

## 2022-11-29 ENCOUNTER — OFFICE VISIT (OUTPATIENT)
Dept: FAMILY MEDICINE CLINIC | Facility: CLINIC | Age: 52
End: 2022-11-29

## 2022-11-29 VITALS
OXYGEN SATURATION: 97 % | HEIGHT: 71 IN | TEMPERATURE: 97.5 F | SYSTOLIC BLOOD PRESSURE: 112 MMHG | BODY MASS INDEX: 33.6 KG/M2 | RESPIRATION RATE: 17 BRPM | HEART RATE: 72 BPM | WEIGHT: 240 LBS | DIASTOLIC BLOOD PRESSURE: 84 MMHG

## 2022-11-29 DIAGNOSIS — R21 RASH: Primary | ICD-10-CM

## 2022-11-29 DIAGNOSIS — R21 GROIN RASH: ICD-10-CM

## 2022-11-29 RX ORDER — METHYLPREDNISOLONE 4 MG/1
TABLET ORAL
Qty: 21 TABLET | Refills: 0 | Status: SHIPPED | OUTPATIENT
Start: 2022-11-29

## 2022-11-29 RX ORDER — CLOTRIMAZOLE AND BETAMETHASONE DIPROPIONATE 10; .64 MG/G; MG/G
CREAM TOPICAL 2 TIMES DAILY
Qty: 30 G | Refills: 0 | Status: SHIPPED | OUTPATIENT
Start: 2022-11-29

## 2022-11-29 NOTE — PROGRESS NOTES
Assessment/Plan:    1  Rash  -     methylPREDNISolone 4 MG tablet therapy pack; Use as directed on package  -     Ambulatory Referral to Dermatology; Future    2  Groin rash  -     clotrimazole-betamethasone (LOTRISONE) 1-0 05 % cream; Apply topically 2 (two) times a day            Patient Instructions:  Supportive care discussed and advised  Advised to RTO for any worsening and no improvement  Follow up for no improvement and worsening of conditions  Patient advised and educated when to see immediate medical care  Return in about 1 month (around 12/29/2022) for Annual physical       Future Appointments   Date Time Provider Lele Herr   12/16/2022  7:30  [facility] 1 Hema Wilkes   1/4/2023  2:30 PM DO HAWA Arreola [facility]           Subjective:      Patient ID: Kalen Moran is a 46 y o  male  Chief Complaint   Patient presents with   • Rash     Hives on lower back, buttock, and upper thighs  Very itchy  Onset: 2 weeks  Has been using jock itch cream  [facility]         Vitals:  /84   Pulse 72   Temp 97 5 °F (36 4 °C)   Resp 17   Ht 5' 11 25" (1 81 m)   Wt 109 kg (240 lb)   SpO2 97%   BMI 33 24 kg/m²      HPI  Patient stated that got rash in groin area couple of weeks ago which improved after using OTC cream but now gets itching at his bilateral buttock area and back of his thighs and stated that sometimes sees tiny bumps but then resolved on its own and does not really has rash but gets very itchy  Denies fever and chills  Stated that moved from [facility] couple of weeks ago and never changed any body products, laundry detergents and food        The following portions of the patient's history were reviewed and updated as appropriate: allergies, current medications, past family history, past medical history, past social history, past surgical history and problem list       Review of Systems   Constitutional: Negative  Respiratory: Negative  Cardiovascular: Negative  Genitourinary: Negative  Skin: Positive for rash  Neurological: Negative  Psychiatric/Behavioral: Negative  Objective:    Social History     Tobacco Use   Smoking Status Former   • Packs/day: 0 50   • Years: 5 00   • Pack years: 2 50   • Types: Cigarettes   • Start date: 5   • Quit date: 200   • Years since quittin 9   Smokeless Tobacco Never       Allergies: No Known Allergies      Current Outpatient Medications   Medication Sig Dispense Refill   • clotrimazole-betamethasone (LOTRISONE) 1-0 05 % cream Apply topically 2 (two) times a day 30 g 0   • methylPREDNISolone 4 MG tablet therapy pack Use as directed on package 21 tablet 0   • metoprolol tartrate (LOPRESSOR) 50 mg tablet Take 1 tablet (50 mg total) by mouth every 12 (twelve) hours 180 tablet 3   • olmesartan (BENICAR) 40 mg tablet Take 40 mg by mouth daily     • rivaroxaban (XARELTO) 20 mg tablet Take 20 mg by mouth     • sildenafil (VIAGRA) 100 mg tablet Take 100 mg by mouth daily as needed for erectile dysfunction       No current facility-administered medications for this visit  Physical Exam  Vitals reviewed: Luzmaria Divine  Exam conducted with a chaperone present  Constitutional:       Appearance: Normal appearance  HENT:      Head: Normocephalic  Nose: Nose normal    Eyes:      Conjunctiva/sclera: Conjunctivae normal    Cardiovascular:      Rate and Rhythm: Normal rate  Pulmonary:      Effort: Pulmonary effort is normal       Breath sounds: Normal breath sounds  Musculoskeletal:         General: No swelling or tenderness  Normal range of motion  Skin:     General: Skin is warm and dry  Findings: Rash present  Neurological:      Mental Status: He is alert and oriented to person, place, and time  Psychiatric:         Mood and Affect: Mood normal          Behavior: Behavior normal          Thought Content:  Thought content normal          Judgment: Judgment normal                      ANDIE Nick

## 2022-11-29 NOTE — PATIENT INSTRUCTIONS
Claritin 10 mg one tablet daily with famotidine 20 mg one tablet daily for 2 to 3 weeks  Supportive care discussed and advised  Advised to RTO for any worsening and no improvement  Follow up for no improvement and worsening of conditions  Patient advised and educated when to see immediate medical care

## 2022-12-19 ENCOUNTER — RA CDI HCC (OUTPATIENT)
Dept: OTHER | Facility: HOSPITAL | Age: 52
End: 2022-12-19

## 2022-12-19 NOTE — PROGRESS NOTES
[facility]  coding opportunities       Chart reviewed, no opportunity found: CHART REVIEWED, NO OPPORTUNITY FOUND        Patients Insurance        Commercial Insurance: Asuncion Blum

## 2023-02-09 DIAGNOSIS — I10 HYPERTENSION, UNSPECIFIED TYPE: Primary | ICD-10-CM

## 2023-02-10 RX ORDER — OLMESARTAN MEDOXOMIL 40 MG/1
TABLET ORAL
Qty: 90 TABLET | Refills: 1 | Status: SHIPPED | OUTPATIENT
Start: 2023-02-10

## 2023-02-20 ENCOUNTER — RA CDI HCC (OUTPATIENT)
Dept: OTHER | Facility: HOSPITAL | Age: 53
End: 2023-02-20

## 2023-02-20 NOTE — PROGRESS NOTES
[facility] [address]  coding opportunities       Chart reviewed, no opportunity found: CHART REVIEWED, NO OPPORTUNITY FOUND        Patients Insurance     Medicare Insurance: Medicare

## 2023-02-23 ENCOUNTER — CONSULT (OUTPATIENT)
Dept: DERMATOLOGY | Age: 53
End: 2023-02-23

## 2023-02-23 VITALS — WEIGHT: 247 LBS | BODY MASS INDEX: 34.58 KG/M2 | TEMPERATURE: 97.7 F | HEIGHT: 71 IN

## 2023-02-23 DIAGNOSIS — L30.4 INTERTRIGO: Primary | ICD-10-CM

## 2023-02-23 DIAGNOSIS — R21 RASH: ICD-10-CM

## 2023-02-23 RX ORDER — NYSTATIN 100000 [USP'U]/G
POWDER TOPICAL 2 TIMES DAILY
Qty: 60 G | Refills: 3 | Status: SHIPPED | OUTPATIENT
Start: 2023-02-23 | End: 2023-03-25

## 2023-02-23 NOTE — PATIENT INSTRUCTIONS
INTERTRIGO    Assessment and Plan:  Based on a thorough discussion of this condition and the management approach to it (including a comprehensive discussion of the known risks, side effects and potential benefits of treatment), the patient (family) agrees to implement the following specific plan:  Stop Clotrimazole/Betamethsone   Start Nystatin powder twice a day topically as needed to inner folds near groin   Start Ciclopirox 0 77% cream twice a day topically near groin for 2-4 weeks   Antiperspirant-deodorant or spray discussed to try  (start off with test spot to make sure there is no irritant)   If no improvement contact office     Assessment and Plan  Intertrigo describes a rash in the flexures or body folds, such as behind the ears, in the folds of the neck, under the arms (axillae), under a protruding abdomen, in the groin, between the buttocks, in the finger webs or toe spaces  Although intertrigo may affect one skin fold, it is common for it to involve multiple sites  Intertrigo can affect males and females of any age  It is particularly common in people that are overweight or obese (see metabolic syndrome)  Other contributing factors are:  Genetic tendency to skin disease  Hyperhidrosis (excessive sweating)    Intertrigo can be acute (recent onset), relapsing (recurrent), or chronic (present for more than 6 weeks)  The exact appearance and behaviour depends on the underlying cause or causes  The skin affected by intertrigo is inflamed, ie reddened and uncomfortable  It may become moist and macerated, leading to fissuring (cracks) and peeling  Intertrigo is due to genetic and environmental factors  Flexural skin has relatively high surface temperature  Moisture from insensible water loss and sweating cannot evaporate due to occlusion  Friction from movement of adjacent skin results in chafing      The microorganisms that are normally resident on flexural skin, the microbiome, include corynebacteria, other bacteria and yeasts  These multiply in warm moist environments and may cause disease  We can classify intertrigo into infectious and inflammatory origin but there is often overlap  Infections tend to be unilateral and asymmetrical   Inflammatory disorders tend to be symmetrical affecting armpits, groins, under the breasts and the abdominal folds, except atopic dermatitis, which more often arises on the neck, and in elbow and knee creases  Investigations may be necessary to determine the cause of intertrigo  A swab for microscopy and culture of bacteria (microbiology)  A scraping for microscopy and culture of fungi (mycology)  A skin biopsy may be performed for histopathology if the skin condition is unusual or fails to respond to treatment  What is the treatment for intertrigo? Treatment depends on the underlying cause, if identified, and on which micro-organisms are present in the rash  Combinations are common  Sweating may be reduced with a gentle antiperspirant  Physical exertion should be followed by a bath and completely drying the skin folds using a hair dryer on cool setting, soft towel and/or corn starch powder  Triple paste contains petrolatum, zinc oxide, and aluminum acetate solution to reduce friction, irritation and sweating  Bacteria may be treated with topical antibiotics such as fusidic acid cream, mupirocin ointment, or oral antibiotics such as flucloxacillin and erythromycin  Yeasts and fungi may be treated with topical antifungals such as clotrimazole and terbinafine cream or oral antifungal agents such as itraconazole or terbinafine  Inflammatory skin diseases are often treated with low potency topical steroid creams such as hydrocortisone  More potent steroids are usually avoided in the flexures because they may cause skin thinning resulting in stretch marks (striae) and even ulcers   Calcineurin inhibitors such as tacrolimus ointment or pimecrolimus cream may also prove effective

## 2023-02-23 NOTE — PROGRESS NOTES
[facility] Note     Patient Name: Eleni Carroll  Encounter Date: 02/23/2023     Have you been cared for by a [facility] D[facility]atologist in the last 3 years and, if so, which description applies to you? NO  I am considered a "new" patient and must complete all patient intake questions  I am MALE/not capable of bearing children  REVIEW OF SYSTEMS:  Have you recently had or currently have any of the following? · Recent fever or chills? No  · Any non-healing wound? No   PAST MEDICAL HISTORY:  Have you personally ever had or currently have any of the following? If "YES," then please provide more detail  · Skin cancer (such as Melanoma, Basal Cell Carcinoma, Squamous Cell Carcinoma? No  · Tuberculosis, HIV/AIDS, Hepatitis B or C: No  · Systemic Immunosuppression such as Diabetes, Biologic or Immunotherapy, Chemotherapy, Organ Transplantation, Bone Marrow Transplantation No  · Radiation Treatment No   FAMILY HISTORY:  Any "first degree relatives" (parent, brother, sister, or child) with the following? • Skin Cancer, Pancreatic or Other Cancer? No   PATIENT EXPERIENCE:    • Do you want the Dermatologist to perform a COMPLETE skin exam today including a clinical examination under the "bra and underwear" areas? NO  • If necessary, do we have your permission to call and leave a detailed message on your Preferred Phone number that includes your specific medical information?   Yes      No Known Allergies   Current Outpatient Medications:   •  clotrimazole-betamethasone (LOTRISONE) 1-0 05 % cream, Apply topically 2 (two) times a day, Disp: 30 g, Rfl: 0  •  metoprolol tartrate (LOPRESSOR) 50 mg tablet, Take 1 tablet (50 mg total) by mouth every 12 (twelve) hours, Disp: 180 tablet, Rfl: 3  •  olmesartan (BENICAR) 40 mg tablet, TAKE ONE TABLET BY MOUTH DAILY, Disp: 90 tablet, Rfl: 1  •  rivaroxaban (XARELTO) 20 mg tablet, Take 20 mg by mouth, Disp: , Rfl:   •  sildenafil (VIAGRA) 100 mg tablet, Take 100 mg by mouth daily as needed for erectile dysfunction, Disp: , Rfl:   •  methylPREDNISolone 4 MG tablet therapy pack, Use as directed on package, Disp: 21 tablet, Rfl: 0          • Whom besides the patient is providing clinical information about today's encounter?   o NO ADDITIONAL HISTORIAN (patient alone provided history)    Physical Exam and Assessment/Plan by Diagnosis:    INTERTRIGO    Physical Exam:  • Anatomic Location Affected:  Right and left groin   • Morphological Description:  Pink patches   • Pertinent Positives:  • Pertinent Negatives: Additional History of Present Condition:  Started after moving back from [place] about 5 months ago  Itchy  pcp prescribed clotrimazole with betamethasone for folds near groin and an antihistamine  He states he is still itchy    Assessment and Plan:  Based on a thorough discussion of this condition and the management approach to it (including a comprehensive discussion of the known risks, side effects and potential benefits of treatment), the patient (family) agrees to implement the following specific plan:  • Stop Clotrimazole/Betamethsone   • Start Nystatin powder twice a day topically as needed to inner folds near groin   • Start Ciclopirox 0 77% cream twice a day topically near groin for 2-4 weeks   • Antiperspirant-deodorant or spray discussed to try  (start off with test spot to make sure there is no irritant)     Assessment and Plan  Intertrigo describes a rash in the flexures or body folds, such as behind the ears, in the folds of the neck, under the arms (axillae), under a protruding abdomen, in the groin, between the buttocks, in the finger webs or toe spaces  Although intertrigo may affect one skin fold, it is common for it to involve multiple sites  Intertrigo can affect males and females of any age  It is particularly common in people that are overweight or obese (see metabolic syndrome)   Other contributing factors are:  • Genetic tendency to skin disease  • Hyperhidrosis (excessive sweating)    Intertrigo can be acute (recent onset), relapsing (recurrent), or chronic (present for more than 6 weeks)  The exact appearance and behaviour depends on the underlying cause or causes  The skin affected by intertrigo is inflamed, ie reddened and uncomfortable  It may become moist and macerated, leading to fissuring (cracks) and peeling  Intertrigo is due to genetic and environmental factors  • Flexural skin has relatively high surface temperature  • Moisture from insensible water loss and sweating cannot evaporate due to occlusion  • Friction from movement of adjacent skin results in chafing  The microorganisms that are normally resident on flexural skin, the microbiome, include corynebacteria, other bacteria and yeasts  These multiply in warm moist environments and may cause disease  We can classify intertrigo into infectious and inflammatory origin but there is often overlap  • Infections tend to be unilateral and asymmetrical   • Inflammatory disorders tend to be symmetrical affecting armpits, groins, under the breasts and the abdominal folds, except atopic dermatitis, which more often arises on the neck, and in elbow and knee creases  Investigations may be necessary to determine the cause of intertrigo  • A swab for microscopy and culture of bacteria (microbiology)  • A scraping for microscopy and culture of fungi (mycology)  • A skin biopsy may be performed for histopathology if the skin condition is unusual or fails to respond to treatment  What is the treatment for intertrigo? Treatment depends on the underlying cause, if identified, and on which micro-organisms are present in the rash  Combinations are common  • Sweating may be reduced with a gentle antiperspirant  • Physical exertion should be followed by a bath and completely drying the skin folds using a hair dryer on cool setting, soft towel and/or corn starch powder    • Triple paste contains petrolatum, zinc oxide, and aluminum acetate solution to reduce friction, irritation and sweating  • Bacteria may be treated with topical antibiotics such as fusidic acid cream, mupirocin ointment, or oral antibiotics such as flucloxacillin and erythromycin  • Yeasts and fungi may be treated with topical antifungals such as clotrimazole and terbinafine cream or oral antifungal agents such as itraconazole or terbinafine  • Inflammatory skin diseases are often treated with low potency topical steroid creams such as hydrocortisone  More potent steroids are usually avoided in the flexures because they may cause skin thinning resulting in stretch marks (striae) and even ulcers  Calcineurin inhibitors such as tacrolimus ointment or pimecrolimus cream may also prove effective        Scribe Attestation    I,:  Florentin Walker am acting as a scribe while in the presence of the attending physician :       I,:  Luann Browne MD personally performed the services described in this documentation    as scribed in my presence :

## 2023-02-28 ENCOUNTER — OFFICE VISIT (OUTPATIENT)
Dept: FAMILY MEDICINE CLINIC | Facility: CLINIC | Age: 53
End: 2023-02-28

## 2023-02-28 VITALS
WEIGHT: 243.62 LBS | SYSTOLIC BLOOD PRESSURE: 132 MMHG | BODY MASS INDEX: 34.1 KG/M2 | HEART RATE: 88 BPM | RESPIRATION RATE: 18 BRPM | HEIGHT: 71 IN | TEMPERATURE: 97.6 F | DIASTOLIC BLOOD PRESSURE: 82 MMHG

## 2023-02-28 DIAGNOSIS — E78.2 MIXED HYPERLIPIDEMIA: ICD-10-CM

## 2023-02-28 DIAGNOSIS — I48.0 PAROXYSMAL ATRIAL FIBRILLATION (HCC): ICD-10-CM

## 2023-02-28 DIAGNOSIS — I10 BENIGN ESSENTIAL HYPERTENSION: Primary | ICD-10-CM

## 2023-02-28 DIAGNOSIS — Z11.59 NEED FOR HEPATITIS C SCREENING TEST: ICD-10-CM

## 2023-02-28 DIAGNOSIS — Z12.5 SCREENING FOR PROSTATE CANCER: ICD-10-CM

## 2023-02-28 DIAGNOSIS — Z23 NEED FOR VACCINATION: ICD-10-CM

## 2023-02-28 DIAGNOSIS — Z13.6 SCREENING FOR CARDIOVASCULAR CONDITION: ICD-10-CM

## 2023-02-28 NOTE — PROGRESS NOTES
Assessment/Plan:    1  Benign essential hypertension  -     CBC; Future    2  Paroxysmal atrial fibrillation [facility])  Assessment & Plan:  Pt had an ablation - has not had an epsiode since he had his ablation  Still on xaralto and metoprolol    Orders:  -     CBC; Future    3  Mixed hyperlipidemia  -     CBC; Future    4  Screening for prostate cancer  -     CBC; Future  -     PSA, Total Screen; Future    5  Screening for cardiovascular condition  -     CBC; Future  -     Comprehensive metabolic panel; Future  -     Lipid Panel with Direct LDL reflex; Future    6  Need for hepatitis C screening test  -     CBC; Future  -     Hepatitis C antibody; Future    7  BMI 33 0-33 9,adult    8  Need for vaccination  -     TDAP VACCINE GREATER THAN OR EQUAL TO 6YO IM  -     Zoster Vaccine Recombinant IM            Patient Instructions     Obesity   AMBULATORY CARE:   Obesity  means your body mass index (BMI) is greater than 30  Your healthcare provider will use your age, height, and weight to measure your BMI  The risks of obesity include  many health problems, including injuries or physical disability  Diabetes (high blood sugar level)    High blood pressure or high cholesterol    Heart disease    Stroke    Gallbladder or liver disease    Cancer of the colon, breast, prostate, liver, or kidney    Sleep apnea    Arthritis or gout    Screening  is done to check for health conditions before you have signs or symptoms  If you are 28to 79years old, your blood sugar level may be checked every 3 years for signs of prediabetes or diabetes  Your healthcare provider will check your blood pressure at each visit  High blood pressure can lead to a stroke or other problems  Your provider may check for signs of heart disease, cancer, or other health problems  Seek care immediately if:   You have a severe headache, confusion, or difficulty speaking  You have weakness on one side of your body      You have chest pain, sweating, or shortness of breath  Call your doctor if:   You have symptoms of gallbladder or liver disease, such as pain in your upper abdomen  You have knee or hip pain and discomfort while walking  You have symptoms of diabetes, such as intense hunger and thirst, and frequent urination  You have symptoms of sleep apnea, such as snoring or daytime sleepiness  You have questions or concerns about your condition or care  Treatment for obesity  focuses on helping you lose weight to improve your health  Even a small decrease in BMI can reduce the risk for many health problems  Your healthcare provider will help you set a weight-loss goal   Lifestyle changes  are the first step in treating obesity  These include making healthy food choices and getting regular physical activity  Your healthcare provider may suggest a weight-loss program that involves coaching, education, and therapy  Medicine  may help you lose weight when it is used with a healthy foods and physical activity  Surgery  can help you lose weight if you have obesity along with other health problems  Several types of weight-loss surgery are available  Ask your healthcare provider for more information  Tips for safe weight loss:   Set small, realistic goals  An example of a small goal is to walk for 20 minutes 5 days a week  [place] goal is to lose 5% of your body weight  Ask for support  Tell friends, family members, and coworkers about your goals  Ask someone to lose weight with you  You may also want to join a weight-loss support group  Identify foods or triggers that may cause you to overeat  Remove tempting high-calorie foods from your home and workplace  Place a bowl of fresh fruit on your kitchen counter  If stress causes you to eat, find other ways to cope with stress  A counselor or therapist may be able to help you  Track your daily calories and activity  Write down what you eat and drink   Also write down how many minutes of physical activity you do each day  Track your weekly weight  Weigh yourself in the morning, before you eat or drink anything but after you use the bathroom  Use the same scale, in the same place, and in similar clothing each time  Only weigh yourself 1 to 2 times each week, or as directed  You may become discouraged if you weigh yourself every day  Eating changes: You will need to eat 500 to 1,000 fewer calories each day than you currently eat to lose 1 to 2 pounds a week  The following changes will help you cut calories:  Eat smaller portions  Use small plates, no larger than 9 inches in diameter  Fill your plate half full of fruits and vegetables  Measure your food using measuring cups until you know what a serving size looks like  Eat 3 meals and 1 or 2 snacks each day  Plan your meals in advance  Lorena Avila and eat at home most of the time  Eat slowly  Do not skip meals  Skipping meals can lead to overeating later in the day  This can make it harder for you to lose weight  Talk with a dietitian to help you make a meal plan and schedule that is right for you  Eat fruits and vegetables at every meal   They are low in calories and high in fiber, which makes you feel full  Do not add butter, margarine, or cream sauce to vegetables  Use herbs to season steamed vegetables  Eat less fat and fewer fried foods  Eat more baked or grilled chicken and fish  These protein sources are lower in calories and fat than red meat  Limit fast food  Dress your salads with olive oil and vinegar instead of bottled dressing  Limit the amount of sugar you eat  Do not drink sugary beverages  Limit alcohol  Activity changes:  Physical activity is good for your body in many ways  It helps you burn calories and build strong muscles  It decreases stress and depression, and improves your mood  It can also help you sleep better   Talk to your healthcare provider before you begin an exercise program   Exercise for at least 30 minutes 5 days a week  Start slowly  Set aside time each day for physical activity that you enjoy and that is convenient for you  It is best to do both weight training and an activity that increases your heart rate, such as walking, bicycling, or swimming  Find ways to be more active  Do yard work and housecleaning  Walk up the stairs instead of using elevators  Spend your leisure time going to events that require walking, such as outdoor festivals or fairs  This extra physical activity can help you lose weight and keep it off  Follow up with your doctor as directed: You may need to meet with a dietitian  Write down your questions so you remember to ask them during your visits  © Copyright [employer] 2022 Information is for End User's use only and may not be sold, redistributed or otherwise used for commercial purposes  The above information is an  only  It is not intended as medical advice for individual conditions or treatments  Talk to your doctor, nurse or pharmacist before following any medical regimen to see if it is safe and effective for you  No follow-ups on file  Subjective:      Patient ID: Jeimy Hutson is a 48 y o  male  Chief Complaint   Patient presents with   • Establish Care     Blood work request nm lpn       Pt is here to reestablish , being seen by me first time since 2016  Pt moved back fro [address] [place] [address]  Pt ended up having ablation for his A fib while he was away  Pt states since he has been back for the last 5 months he was having skin issues - last week pt was seen by derm and he was tols she sees that in people moving back from the [facility]  She also mentioned it could be diabetes  PT states he would like to have labs done    Pt has reestablished with Cardio locally    Pt had colon screening done in [address] [place] [address]    Had 3 actually      The following portions of the patient's history were reviewed and updated as appropriate: allergies, current medications, past family history, past medical history, past social history, past surgical history and problem list     Review of Systems   Constitutional: Negative for activity change, appetite change, chills, diaphoresis, fatigue, fever and unexpected weight change  HENT: Negative for congestion, dental problem, ear pain, mouth sores, sinus pressure, sinus pain, sore throat and trouble swallowing  Eyes: Negative for photophobia, discharge and itching  Respiratory: Negative for apnea, chest tightness and shortness of breath  Cardiovascular: Negative for chest pain, palpitations and leg swelling  Gastrointestinal: Negative for abdominal distention, abdominal pain, blood in stool, nausea and vomiting  Endocrine: Negative for cold intolerance, heat intolerance, polydipsia, polyphagia and polyuria  Genitourinary: Negative for difficulty urinating  Musculoskeletal: Negative for arthralgias  Skin: Negative for color change and wound  Neurological: Negative for dizziness, syncope, speech difficulty and headaches  Hematological: Negative for adenopathy  Psychiatric/Behavioral: Negative for agitation and behavioral problems           Current Outpatient Medications   Medication Sig Dispense Refill   • ciclopirox (LOPROX) 0 77 % cream Apply topically 2 (two) times a day Apply twice a day to groin for 2-4 weeks 90 g 2   • metoprolol tartrate (LOPRESSOR) 50 mg tablet Take 1 tablet (50 mg total) by mouth every 12 (twelve) hours 180 tablet 3   • nystatin (MYCOSTATIN) powder Apply topically 2 (two) times a day Apply twice a day to groin for 2-4 weeks 60 g 3   • olmesartan (BENICAR) 40 mg tablet TAKE ONE TABLET BY MOUTH DAILY 90 tablet 1   • rivaroxaban (XARELTO) 20 mg tablet Take 20 mg by mouth     • sildenafil (VIAGRA) 100 mg tablet Take 100 mg by mouth daily as needed for erectile dysfunction       No current facility-administered medications for this visit  Objective:    /82   Pulse 88   Temp 97 6 °F (36 4 °C)   Resp 18   Ht 5' 11" (1 803 m)   Wt 111 kg (243 lb 9 9 oz)   BMI 33 98 kg/m²        Physical Exam  Vitals and nursing note reviewed  Constitutional:       General: He is not in acute distress  Appearance: He is well-developed  He is not diaphoretic  HENT:      Head: Normocephalic and atraumatic  Right Ear: External ear normal       Left Ear: External ear normal       Nose: Nose normal       Mouth/Throat:      Pharynx: No oropharyngeal exudate  Eyes:      General: No scleral icterus  Right eye: No discharge  Left eye: No discharge  Pupils: Pupils are equal, round, and reactive to light  Neck:      Thyroid: No thyromegaly  Cardiovascular:      Rate and Rhythm: Normal rate  Heart sounds: Normal heart sounds  No murmur heard  Pulmonary:      Effort: Pulmonary effort is normal  No respiratory distress  Breath sounds: Normal breath sounds  No wheezing  Abdominal:      General: Bowel sounds are normal  There is no distension  Palpations: Abdomen is soft  There is no mass  Tenderness: There is no abdominal tenderness  There is no guarding or rebound  Musculoskeletal:         General: Normal range of motion  Skin:     General: Skin is warm and dry  Findings: No erythema or rash  Neurological:      Mental Status: He is alert  Coordination: Coordination normal       Deep Tendon Reflexes: Reflexes normal    Psychiatric:         Behavior: Behavior normal                 Mendy Pedroza, DO  BMI Counseling: Body mass index is 33 98 kg/m²  The BMI is above normal  Nutrition recommendations include reducing portion sizes

## 2023-02-28 NOTE — ASSESSMENT & PLAN NOTE
Pt had an ablation - has not had an epsiode since he had his ablation   Still on xaralto and metoprolol

## 2023-02-28 NOTE — PATIENT INSTRUCTIONS
Obesity   AMBULATORY CARE:   Obesity  means your body mass index (BMI) is greater than 30  Your healthcare provider will use your age, height, and weight to measure your BMI  The risks of obesity include  many health problems, including injuries or physical disability  • Diabetes (high blood sugar level)    • High blood pressure or high cholesterol    • Heart disease    • Stroke    • Gallbladder or liver disease    • Cancer of the colon, breast, prostate, liver, or kidney    • Sleep apnea    • Arthritis or gout    Screening  is done to check for health conditions before you have signs or symptoms  If you are 28to 79years old, your blood sugar level may be checked every 3 years for signs of prediabetes or diabetes  Your healthcare provider will check your blood pressure at each visit  High blood pressure can lead to a stroke or other problems  Your provider may check for signs of heart disease, cancer, or other health problems  Seek care immediately if:   • You have a severe headache, confusion, or difficulty speaking  • You have weakness on one side of your body  • You have chest pain, sweating, or shortness of breath  Call your doctor if:   • You have symptoms of gallbladder or liver disease, such as pain in your upper abdomen  • You have knee or hip pain and discomfort while walking  • You have symptoms of diabetes, such as intense hunger and thirst, and frequent urination  • You have symptoms of sleep apnea, such as snoring or daytime sleepiness  • You have questions or concerns about your condition or care  Treatment for obesity  focuses on helping you lose weight to improve your health  Even a small decrease in BMI can reduce the risk for many health problems  Your healthcare provider will help you set a weight-loss goal   • Lifestyle changes  are the first step in treating obesity  These include making healthy food choices and getting regular physical activity   Your healthcare provider may suggest a weight-loss program that involves coaching, education, and therapy  • Medicine  may help you lose weight when it is used with a healthy foods and physical activity  • Surgery  can help you lose weight if you have obesity along with other health problems  Several types of weight-loss surgery are available  Ask your healthcare provider for more information  Tips for safe weight loss:   • Set small, realistic goals  An example of a small goal is to walk for 20 minutes 5 days a week  Anther goal is to lose 5% of your body weight  • Ask for support  Tell friends, family members, and coworkers about your goals  Ask someone to lose weight with you  You may also want to join a weight-loss support group  • Identify foods or triggers that may cause you to overeat  Remove tempting high-calorie foods from your home and workplace  Place a bowl of fresh fruit on your kitchen counter  If stress causes you to eat, find other ways to cope with stress  A counselor or therapist may be able to help you  • Track your daily calories and activity  Write down what you eat and drink  Also write down how many minutes of physical activity you do each day  • Track your weekly weight  Weigh yourself in the morning, before you eat or drink anything but after you use the bathroom  Use the same scale, in the same place, and in similar clothing each time  Only weigh yourself 1 to 2 times each week, or as directed  You may become discouraged if you weigh yourself every day  Eating changes: You will need to eat 500 to 1,000 fewer calories each day than you currently eat to lose 1 to 2 pounds a week  The following changes will help you cut calories:  • Eat smaller portions  Use small plates, no larger than 9 inches in diameter  Fill your plate half full of fruits and vegetables  Measure your food using measuring cups until you know what a serving size looks like           • Eat 3 meals and 1 or 2 snacks each day  Plan your meals in advance  Wilmer Fleischer and eat at home most of the time  Eat slowly  Do not skip meals  Skipping meals can lead to overeating later in the day  This can make it harder for you to lose weight  Talk with a dietitian to help you make a meal plan and schedule that is right for you  • Eat fruits and vegetables at every meal   They are low in calories and high in fiber, which makes you feel full  Do not add butter, margarine, or cream sauce to vegetables  Use herbs to season steamed vegetables  • Eat less fat and fewer fried foods  Eat more baked or grilled chicken and fish  These protein sources are lower in calories and fat than red meat  Limit fast food  Dress your salads with olive oil and vinegar instead of bottled dressing  • Limit the amount of sugar you eat  Do not drink sugary beverages  Limit alcohol  Activity changes:  Physical activity is good for your body in many ways  It helps you burn calories and build strong muscles  It decreases stress and depression, and improves your mood  It can also help you sleep better  Talk to your healthcare provider before you begin an exercise program   • Exercise for at least 30 minutes 5 days a week  Start slowly  Set aside time each day for physical activity that you enjoy and that is convenient for you  It is best to do both weight training and an activity that increases your heart rate, such as walking, bicycling, or swimming  • Find ways to be more active  Do yard work and housecleaning  Walk up the stairs instead of using elevators  Spend your leisure time going to events that require walking, such as outdoor festivals or fairs  This extra physical activity can help you lose weight and keep it off  Follow up with your doctor as directed: You may need to meet with a dietitian  Write down your questions so you remember to ask them during your visits    © Copyright [employer] 2022 Information is for End User's use only and may not be sold, redistributed or otherwise used for commercial purposes  The above information is an  only  It is not intended as medical advice for individual conditions or treatments  Talk to your doctor, nurse or pharmacist before following any medical regimen to see if it is safe and effective for you

## 2023-03-01 ENCOUNTER — TELEPHONE (OUTPATIENT)
Dept: ADMINISTRATIVE | Facility: OTHER | Age: 53
End: 2023-03-01

## 2023-03-01 NOTE — LETTER
Procedure Request Form: Colonoscopy      Date Requested: 23  Patient: Robertha Harsh  Patient : /15/1970   Referring Provider: Ozzya People, DO        Date of Procedure ______________________________       The above patient has informed us that they have completed their   most recent Colonoscopy at your facility  Please complete   this form and attach all corresponding procedure reports/results  Comments __________________________________________________________  ____________________________________________________________________  ____________________________________________________________________  ____________________________________________________________________    Facility Completing Procedure _________________________________________    Form Completed By (print name) _______________________________________      Signature __________________________________________________________      These reports are needed for  compliance  Please fax this completed form and a copy of the procedure report to our office located at [place] [address] as soon as possible to Fax [phone] attention [place]: Phone [phone]    We thank you for your assistance in treating our mutual patient

## 2023-03-01 NOTE — TELEPHONE ENCOUNTER
Upon review of the In Basket request and the patient's chart, initial outreach has been made via fax to facility  Please see Contacts section for details       Thank you  Amy Barone MA

## 2023-03-01 NOTE — TELEPHONE ENCOUNTER
----- Message from Mariaa Gruber DO sent at 2/28/2023  3:13 PM EST -----  Regarding: colon  02/28/23 3:13 PM    Hello, our patient Laurent Chapman has had CRC: Colonoscopy completed/performed  Please assist in updating the patient chart by making an External outreach to [facility] facility located in [facility]   The date of service is within last few years  Info is in HPI of todays note    Thank you,  Mariaa Gruber DO  [facility]

## 2023-03-03 LAB
ALBUMIN SERPL-MCNC: 4.5 G/DL (ref 3.8–4.9)
ALBUMIN/GLOB SERPL: 2 {RATIO} (ref 1.2–2.2)
ALP SERPL-CCNC: 57 IU/L (ref 44–121)
ALT SERPL-CCNC: 22 IU/L (ref 0–44)
AST SERPL-CCNC: 17 IU/L (ref 0–40)
BASOPHILS # BLD AUTO: 0 X10E3/UL (ref 0–0.2)
BASOPHILS NFR BLD AUTO: 1 %
BILIRUB SERPL-MCNC: 0.5 MG/DL (ref 0–1.2)
BUN SERPL-MCNC: 17 MG/DL (ref 6–24)
BUN/CREAT SERPL: 15 (ref 9–20)
CALCIUM SERPL-MCNC: 9.4 MG/DL (ref 8.7–10.2)
CHLORIDE SERPL-SCNC: 104 MMOL/L (ref 96–106)
CHOLEST SERPL-MCNC: 182 MG/DL (ref 100–199)
CO2 SERPL-SCNC: 24 MMOL/L (ref 20–29)
CREAT SERPL-MCNC: 1.1 MG/DL (ref 0.76–1.27)
EGFR: 80 ML/MIN/1.73
EOSINOPHIL # BLD AUTO: 0.1 X10E3/UL (ref 0–0.4)
EOSINOPHIL NFR BLD AUTO: 1 %
ERYTHROCYTE [DISTWIDTH] IN BLOOD BY AUTOMATED COUNT: 12.5 % (ref 11.6–15.4)
GLOBULIN SER-MCNC: 2.2 G/DL (ref 1.5–4.5)
GLUCOSE SERPL-MCNC: 96 MG/DL (ref 70–99)
HCT VFR BLD AUTO: 44.8 % (ref 37.5–51)
HCV AB S/CO SERPL IA: NON REACTIVE
HDLC SERPL-MCNC: 43 MG/DL
HGB BLD-MCNC: 15.2 G/DL (ref 13–17.7)
IMM GRANULOCYTES # BLD: 0 X10E3/UL (ref 0–0.1)
IMM GRANULOCYTES NFR BLD: 0 %
LDLC SERPL CALC-MCNC: 127 MG/DL (ref 0–99)
LYMPHOCYTES # BLD AUTO: 1.5 X10E3/UL (ref 0.7–3.1)
LYMPHOCYTES NFR BLD AUTO: 28 %
MCH RBC QN AUTO: 30.5 PG (ref 26.6–33)
MCHC RBC AUTO-ENTMCNC: 33.9 G/DL (ref 31.5–35.7)
MCV RBC AUTO: 90 FL (ref 79–97)
MICRODELETION SYND BLD/T FISH: NORMAL
MONOCYTES # BLD AUTO: 0.4 X10E3/UL (ref 0.1–0.9)
MONOCYTES NFR BLD AUTO: 7 %
NEUTROPHILS # BLD AUTO: 3.3 X10E3/UL (ref 1.4–7)
NEUTROPHILS NFR BLD AUTO: 63 %
PLATELET # BLD AUTO: 224 X10E3/UL (ref 150–450)
POTASSIUM SERPL-SCNC: 4.8 MMOL/L (ref 3.5–5.2)
PROT SERPL-MCNC: 6.7 G/DL (ref 6–8.5)
PSA SERPL-MCNC: 0.6 NG/ML (ref 0–4)
RBC # BLD AUTO: 4.99 X10E6/UL (ref 4.14–5.8)
SODIUM SERPL-SCNC: 140 MMOL/L (ref 134–144)
TRIGL SERPL-MCNC: 63 MG/DL (ref 0–149)
WBC # BLD AUTO: 5.3 X10E3/UL (ref 3.4–10.8)

## 2023-03-03 NOTE — TELEPHONE ENCOUNTER
Upon review of the In Basket request we were able to locate, review, and update the patient chart as requested for CRC: Colonoscopy  Any additional questions or concerns should be emailed to the Practice Liaisons via the appropriate education email address, please do not reply via In Basket      Thank you  Suma Olivia MA

## 2023-03-06 ENCOUNTER — TELEPHONE (OUTPATIENT)
Dept: FAMILY MEDICINE CLINIC | Facility: CLINIC | Age: 53
End: 2023-03-06

## 2023-03-06 DIAGNOSIS — E78.2 MIXED HYPERLIPIDEMIA: Primary | ICD-10-CM

## 2023-03-06 NOTE — TELEPHONE ENCOUNTER
----- Message from Sanjeev Whiting DO sent at 3/6/2023  1:42 PM EST -----  Overall labs are acceptable  LDL - bad hcolesterol is elevated - although lower than the pervious one we had  I would engage in low fat low cholesterol diet and follow numbers in 6 months    I will place orders in chart

## 2023-03-06 NOTE — RESULT ENCOUNTER NOTE
Overall labs are acceptable  LDL - bad hcolesterol is elevated - although lower than the pervious one we had  I would engage in low fat low cholesterol diet and follow numbers in 6 months    I will place orders in chart

## 2023-03-10 ENCOUNTER — OFFICE VISIT (OUTPATIENT)
Dept: CARDIOLOGY CLINIC | Facility: CLINIC | Age: 53
End: 2023-03-10

## 2023-03-10 VITALS
HEIGHT: 71 IN | BODY MASS INDEX: 34.3 KG/M2 | WEIGHT: 245 LBS | HEART RATE: 74 BPM | OXYGEN SATURATION: 98 % | DIASTOLIC BLOOD PRESSURE: 86 MMHG | SYSTOLIC BLOOD PRESSURE: 132 MMHG

## 2023-03-10 DIAGNOSIS — E66.9 OBESITY (BMI 30-39.9): ICD-10-CM

## 2023-03-10 DIAGNOSIS — R06.83 SNORING: ICD-10-CM

## 2023-03-10 DIAGNOSIS — E78.2 MIXED HYPERLIPIDEMIA: ICD-10-CM

## 2023-03-10 DIAGNOSIS — I48.0 PAROXYSMAL ATRIAL FIBRILLATION (HCC): ICD-10-CM

## 2023-03-10 NOTE — PROGRESS NOTES
Consultation - Cardiology Office  [facility]  Juan Benson 48 y o  male MRN: [id number]  : 1970  Unit/Bed#:  Encounter: [id number]      Assessment:     1  Paroxysmal atrial fibrillation (HCC)    2  Mixed hyperlipidemia    3  Obesity (BMI 30-39 9)    4  Snoring        Discussion summary and Plan:    1  Paroxysmal atrial fibrillation  Patient is s/p A-fib ablation in [place] in 2021  Maintaining sinus rhythm  He is on beta-blocker and Xarelto tolerating it very well with no issues  2  Hypertension  Seems to be well controlled with Benicar  Electrolyte are acceptable      3  Dyslipidemia  Recent blood test shows LDL is above the goal   His risk for cardiovascular event is around 5 to 6%  He is on dietary control if cholesterol remains elevated may end up on low-dose statins  4  Obesity with BMI around before  He is motivated to lose weight  5  Snoring with atrial arrhythmias  Patient has not done home sleep study  He is reluctant to do home study at this time  6  Erectile dysfunction  On Viagra as needed    Continue current Rx  Follow-up 6 months  Patient was advised and educated to call our office  immediately if  patient has any new symptoms of chest pain/shortness of breath, near-syncope, syncope, light headedness sustained palpitations or any other cardiovascular symptoms before their scheduled follow-up appointment  Office number was provided #[phone]  Thank you for your consultation  If you have any question please call me at [phone]    Counseling :  A description of the counseling  Goals and Barriers  Patient's ability to self care: Yes  Medication side effect reviewed with patient in detail and all their questions answered to their satisfaction        Primary Care Physician : Osvaldo Green DO      HPI :     Juan Benson is a 48y o  year old male who was referred by primary care doctor for history of paroxysmal atrial fibrillation hypertension  Patient used to live in [place] and had episode of atrial fibrillation at that time which was suppressed with flecainide he moved to [facility] and started to get later on multiple episodes of atrial fibrillation  He was evaluated by EP in  [facility] and he had EP workup done in the form of ablation in September 2021  Since then he is maintaining sinus rhythm  Occasionally still get episodes of fast heartbeat  He has been on Xarelto and metoprolol 50 twice a day  He was recommended sleep study he has not done it yet  He did develop erectile dysfunction with beta-blocker has been taking Viagra as needed  Sometime he misses beta-blocker  Today he was rushing to our office his heart rate on arrival was found to be elevated around 160 beats per minute  His previous cardiac workup reviewed  And note from EP and other note reviewed  He had normal LV systolic function  He had a previously stress echo which was normal and CTA for coronary was normal   His palpitations which he gets occasionally was thought to be PACs and PVCs and they have ordered monitor for him which he was done recently  He denies any nausea and vomiting he does snore  He does not drink much  No other cardiovascular issues  3/10/2023  Above reviewed  Patient came for follow-up  He has a medical history significant for paroxysmal atrial fibrillation s/p ablation in [facility]  and has been on antithrombotic therapy  Ablation was done in September 2021  He is maintaining sinus rhythm  He has no new complaints  He has normal LV systolic function  Previous echo as well as CT of the coronary was normal   He does not get any palpitations EK today shows sinus them heart rate 74 bpm recently blood pressure was cholesterol was slightly elevated and his risk for cardiovascular event is around 5 to 6%  He is on dietary control for now          Review of Systems   Constitutional: Negative for activity change, chills, diaphoresis, fever and unexpected weight change  HENT: Negative for congestion  Eyes: Negative for discharge and redness  Respiratory: Negative for cough, chest tightness, shortness of breath and wheezing  Cardiovascular: Negative  Negative for chest pain, palpitations and leg swelling  Gastrointestinal: Negative for abdominal pain, diarrhea and nausea  Endocrine: Negative  Genitourinary: Negative for decreased urine volume and urgency  Musculoskeletal: Negative  Negative for arthralgias, back pain and gait problem  Skin: Negative for rash and wound  Allergic/Immunologic: Negative  Neurological: Negative for dizziness, seizures, syncope, weakness, light-headedness and headaches  Hematological: Negative  Psychiatric/Behavioral: Positive for sleep disturbance  Negative for agitation and confusion  The patient is not nervous/anxious          Historical Information   Past Medical History:   Diagnosis Date   • Atrial fibrillation ([address] )    • HTN (hypertension)      Past Surgical History:   Procedure Laterality Date   • CARDIAC ELECTROPHYSIOLOGY STUDY AND ABLATION     • CLAVICLE SURGERY Left      Social History     Substance and Sexual Activity   Alcohol Use Yes   • Alcohol/week: 1 0 standard drink   • Types: 1 Cans of beer per week    Comment: few weekly     Social History     Substance and Sexual Activity   Drug Use Never     Social History     Tobacco Use   Smoking Status Former   • Packs/day: 0 50   • Years: 5 00   • Pack years: 2 50   • Types: Cigarettes   • Start date: 5   • Quit date: 200   • Years since quittin 2   Smokeless Tobacco Never     Family History:   Family History   Problem Relation Age of Onset   • Atrial fibrillation Father        Meds/Allergies     No Known Allergies    Current Outpatient Medications:   •  ciclopirox (LOPROX) 0 77 % cream, Apply topically 2 (two) times a day Apply twice a day to groin for 2-4 weeks, Disp: 90 g, Rfl: 2  •  metoprolol tartrate (LOPRESSOR) 50 mg tablet, Take 1 tablet (50 mg total) by mouth every 12 (twelve) hours, Disp: 180 tablet, Rfl: 3  •  nystatin (MYCOSTATIN) powder, Apply topically 2 (two) times a day Apply twice a day to groin for 2-4 weeks, Disp: 60 g, Rfl: 3  •  olmesartan (BENICAR) 40 mg tablet, TAKE ONE TABLET BY MOUTH DAILY, Disp: 90 tablet, Rfl: 1  •  rivaroxaban (XARELTO) 20 mg tablet, Take 20 mg by mouth, Disp: , Rfl:   •  sildenafil (VIAGRA) 100 mg tablet, Take 100 mg by mouth daily as needed for erectile dysfunction, Disp: , Rfl:     Vitals: Blood pressure 132/86, pulse 74, height 5' 11" (1 803 m), weight 111 kg (245 lb), SpO2 98 %  ?  Body mass index is 34 17 kg/m²  Wt Readings from Last 3 Encounters:   03/10/23 111 kg (245 lb)   02/28/23 111 kg (243 lb 9 9 oz)   02/23/23 112 kg (247 lb)     Vitals:    03/10/23 1325   Weight: 111 kg (245 lb)     BP Readings from Last 3 Encounters:   03/10/23 132/86   02/28/23 132/82   11/29/22 112/84         Physical Exam  Constitutional:       General: He is not in acute distress  Appearance: He is well-developed  He is not diaphoretic  Neck:      Thyroid: No thyromegaly  Vascular: No JVD  Cardiovascular:      Rate and Rhythm: Normal rate and regular rhythm  Pulses: Normal pulses  Heart sounds: Normal heart sounds  Pulmonary:      Effort: Pulmonary effort is normal  No respiratory distress  Breath sounds: Normal breath sounds  No wheezing or rales  Abdominal:      General: There is no distension  Palpations: Abdomen is soft  Tenderness: There is no abdominal tenderness  There is no rebound  Musculoskeletal:         General: No tenderness  Normal range of motion  Cervical back: Normal range of motion and neck supple  Skin:     General: Skin is warm and dry  Neurological:      Mental Status: He is alert and oriented to person, place, and time     Psychiatric:         Behavior: Behavior normal  Judgment: Judgment normal            Diagnostic Studies Review Cardio:  Echocardiogram done in [facility] on 07/19/2021 was read as EF 65%  With exercise EF improved to more than 70%  Low risk treadmill exercise echocardiogram as per report  NATALIE done 09/08/2021 shows patient has no left atrial appendage thrombus  EF was normal   Interatrial septum was intact  No significant valvular disease was mention and ascending and descending aorta was free from atherosclerosis as per report  Transthoracic echo 09/08/2021 shows EF 65%, no significant valvular disease  EKG:  Twelve lead EKG done 09/09/2022 shows sinus tachycardia heart rate 116 beats per minute  As per patient this is generally ablation as he was running late and he was running to our office generally heart rate is around 80 to 90s  Twelve-lead EKG 3/10/2023 shows normal sinus some heart rate 74 bpm it is a normal EKG      Lab Review   Lab Results   Component Value Date    WBC 5 3 03/03/2023    HGB 15 2 03/03/2023    HCT 44 8 03/03/2023    MCV 90 03/03/2023    RDW 12 5 03/03/2023     03/03/2023     BMP:  Lab Results   Component Value Date    SODIUM 140 03/03/2023    K 4 8 03/03/2023     03/03/2023    CO2 24 03/03/2023    BUN 17 03/03/2023    CREATININE 1 10 03/03/2023    GLUC 96 03/03/2023    CALCIUM 9 4 03/18/2016    EGFR 80 03/03/2023     Troponins:    LFT:  Lab Results   Component Value Date    AST 17 03/03/2023    ALT 22 03/03/2023    ALKPHOS 65 03/18/2016    TP 6 7 03/03/2023    ALB 4 5 03/03/2023      Lab Results   Component Value Date    QTE4AGJRGFLH 1 770 03/18/2016     Lipid Profile:   Lab Results   Component Value Date    CHOLESTEROL 182 03/03/2023    HDL 43 03/03/2023    LDLCALC 127 (H) 03/03/2023    TRIG 63 03/03/2023     The 10-year ASCVD risk score (Zaynab STONER, et al , 2019) is: 5 9%    Values used to calculate the score:      Age: 48 years      Sex: Male      Is Non- : No Diabetic: No      Tobacco smoker: No      Systolic Blood Pressure: 147 mmHg      Is BP treated: Yes      HDL Cholesterol: 43 mg/dL      Total Cholesterol: 182 mg/dL      Dr Janell Melendez MD [facility]      "This note was completed in part utilizing m-modal fluency direct voice recognition software  Grammatical errors, random word insertion, spelling mistakes, and incomplete sentences may be an occasional consequence of the system secondary to software limitations, ambient noise and hardware issues  Please read the chart carefully and recognize, using context, where substitutions have occurred    If you have any questions or concerns about the context, text or information contained within the body of this dictation, please contact myself, the provider, for further clarification "

## 2023-05-08 DIAGNOSIS — I10 HYPERTENSION, UNSPECIFIED TYPE: ICD-10-CM

## 2023-05-08 RX ORDER — OLMESARTAN MEDOXOMIL 40 MG/1
TABLET ORAL
Qty: 90 TABLET | Refills: 3 | Status: SHIPPED | OUTPATIENT
Start: 2023-05-08

## 2023-06-07 DIAGNOSIS — N52.9 ERECTILE DYSFUNCTION, UNSPECIFIED ERECTILE DYSFUNCTION TYPE: Primary | ICD-10-CM

## 2023-06-08 RX ORDER — SILDENAFIL 100 MG/1
TABLET, FILM COATED ORAL
Qty: 6 TABLET | Refills: 0 | Status: SHIPPED | OUTPATIENT
Start: 2023-06-08

## 2023-06-17 ENCOUNTER — OFFICE VISIT (OUTPATIENT)
Dept: FAMILY MEDICINE CLINIC | Facility: CLINIC | Age: 53
End: 2023-06-17
Payer: COMMERCIAL

## 2023-06-17 VITALS
WEIGHT: 229 LBS | DIASTOLIC BLOOD PRESSURE: 70 MMHG | TEMPERATURE: 97.6 F | RESPIRATION RATE: 18 BRPM | HEIGHT: 71 IN | SYSTOLIC BLOOD PRESSURE: 120 MMHG | HEART RATE: 79 BPM | OXYGEN SATURATION: 99 % | BODY MASS INDEX: 32.06 KG/M2

## 2023-06-17 DIAGNOSIS — N41.0 ACUTE PROSTATITIS: Primary | ICD-10-CM

## 2023-06-17 LAB
SL AMB  POCT GLUCOSE, UA: NORMAL
SL AMB LEUKOCYTE ESTERASE,UA: NORMAL
SL AMB POCT BILIRUBIN,UA: NORMAL
SL AMB POCT BLOOD,UA: NORMAL
SL AMB POCT CLARITY,UA: CLEAR
SL AMB POCT COLOR,UA: NORMAL
SL AMB POCT KETONES,UA: NORMAL
SL AMB POCT NITRITE,UA: NORMAL
SL AMB POCT PH,UA: 5.5
SL AMB POCT SPECIFIC GRAVITY,UA: 1.02
SL AMB POCT URINE PROTEIN: NORMAL
SL AMB POCT UROBILINOGEN: 0.2

## 2023-06-17 PROCEDURE — 99213 OFFICE O/P EST LOW 20 MIN: CPT | Performed by: FAMILY MEDICINE

## 2023-06-17 PROCEDURE — 81003 URINALYSIS AUTO W/O SCOPE: CPT | Performed by: FAMILY MEDICINE

## 2023-06-17 RX ORDER — CIPROFLOXACIN 500 MG/1
500 TABLET, FILM COATED ORAL EVERY 12 HOURS SCHEDULED
Qty: 14 TABLET | Refills: 0 | Status: SHIPPED | OUTPATIENT
Start: 2023-06-17 | End: 2023-06-24

## 2023-06-17 NOTE — PROGRESS NOTES
"Name: Heather Lugo      : 1970      MRN: [id number]  Encounter Provider: Martha Callaway MD  Encounter Date: 2023   Encounter department: [address]     1  Acute prostatitis  -     POCT urine dip auto non-scope  -     Urine culture  -     ciprofloxacin (CIPRO) 500 mg tablet; Take 1 tablet (500 mg total) by mouth every 12 (twelve) hours for 7 days  -     Ambulatory Referral to Urology; Future     Will treat with cipro  Follow up with urologist if symptoms fail to improve  Subjective      HPI   He has 2 day history of left sided pain between his testicle and rectum associated with frequency, urgency, foul urinary odor and burning on urination  His wife has UTI  Sexually active  No fevers/chills  Did have a similar episode in the past and needed antibiotic treatment  Review of Systems   Constitutional: Negative  HENT: Negative  Eyes: Negative  Respiratory: Negative  Cardiovascular: Negative  Gastrointestinal: Negative  Endocrine: Negative  Genitourinary: Positive for dysuria, frequency and testicular pain  Musculoskeletal: Negative  Neurological: Negative  Hematological: Negative  Psychiatric/Behavioral: Negative          Current Outpatient Medications on File Prior to Visit   Medication Sig   • metoprolol tartrate (LOPRESSOR) 50 mg tablet Take 1 tablet (50 mg total) by mouth every 12 (twelve) hours   • olmesartan (BENICAR) 40 mg tablet TAKE ONE TABLET BY MOUTH DAILY   • rivaroxaban (XARELTO) 20 mg tablet Take 20 mg by mouth   • sildenafil (VIAGRA) 100 mg tablet TAKE ONE TABLET BY MOUTH AS NEEDED   • ciclopirox (LOPROX) 0 77 % cream Apply topically 2 (two) times a day Apply twice a day to groin for 2-4 weeks   • nystatin (MYCOSTATIN) powder Apply topically 2 (two) times a day Apply twice a day to groin for 2-4 weeks       Objective     /70   Pulse 79   Temp 97 6 °F (36 4 °C)   Resp 18   Ht 5' 11\" (1 803 m)   Wt 104 " kg (229 lb)   SpO2 99%   BMI 31 94 kg/m²     Physical Exam  Constitutional:       General: He is not in acute distress  Appearance: He is well-developed  He is not diaphoretic  HENT:      Head: Normocephalic and atraumatic  Eyes:      General: No scleral icterus  Right eye: No discharge  Left eye: No discharge  Conjunctiva/sclera: Conjunctivae normal    Cardiovascular:      Rate and Rhythm: Normal rate and regular rhythm  Pulses: Normal pulses  Pulmonary:      Effort: Pulmonary effort is normal  No respiratory distress  Breath sounds: Normal breath sounds  No stridor  No wheezing, rhonchi or rales  Chest:      Chest wall: No tenderness  Abdominal:      General: Abdomen is flat  Bowel sounds are normal  There is no distension  Palpations: Abdomen is soft  There is no mass  Tenderness: There is no abdominal tenderness  There is no right CVA tenderness, left CVA tenderness, guarding or rebound  Hernia: No hernia is present  Genitourinary:     Penis: Normal     Musculoskeletal:         General: Normal range of motion  Cervical back: Normal range of motion  Skin:     General: Skin is warm  Neurological:      Mental Status: He is alert and oriented to person, place, and time  Psychiatric:         Mood and Affect: Mood normal          Behavior: Behavior normal          Thought Content:  Thought content normal          Judgment: Judgment normal        Melania Weaver MD

## 2023-06-20 ENCOUNTER — TELEPHONE (OUTPATIENT)
Dept: FAMILY MEDICINE CLINIC | Facility: CLINIC | Age: 53
End: 2023-06-20

## 2023-06-20 DIAGNOSIS — N50.812 PAIN IN LEFT TESTICLE: Primary | ICD-10-CM

## 2023-06-20 LAB
BACTERIA UR CULT: NORMAL
Lab: NO GROWTH

## 2023-06-20 NOTE — TELEPHONE ENCOUNTER
Notified pt that an order for an US has been placed in his chart and to call central scheduling to make that appt    MAIKEL Thornton

## 2023-06-20 NOTE — TELEPHONE ENCOUNTER
It may be related to an infection, however I placed an order for him to get an ultrasound of his testicles to see if there is something else going on  Please have him schedule this  Thank you

## 2023-06-20 NOTE — TELEPHONE ENCOUNTER
"Patient called stating he has pain in left his testicle  When he was seen Saturday, he had burning while urinating and felt like he was sitting on a \"knot\" under his testicle  The discomfort and pain is a new symptoms since being seen Saturday  He stated the burning while urinating and \"knot\" is starting to subside a little bit  He is concerned with the new discomfort, and wanted to know if this is normal if he has an infection       Jerline Meigs, LPN    "

## 2023-06-22 ENCOUNTER — HOSPITAL ENCOUNTER (OUTPATIENT)
Dept: RADIOLOGY | Facility: HOSPITAL | Age: 53
Discharge: HOME | End: 2023-06-22
Payer: COMMERCIAL

## 2023-06-22 DIAGNOSIS — N50.812 PAIN IN LEFT TESTICLE: ICD-10-CM

## 2023-06-22 PROCEDURE — 76870 US EXAM SCROTUM: CPT

## 2023-06-26 ENCOUNTER — TELEPHONE (OUTPATIENT)
Dept: CARDIOLOGY CLINIC | Facility: CLINIC | Age: 53
End: 2023-06-26

## 2023-06-26 DIAGNOSIS — I10 HYPERTENSION, UNSPECIFIED TYPE: ICD-10-CM

## 2023-06-26 DIAGNOSIS — I48.91 ATRIAL FIBRILLATION, UNSPECIFIED TYPE (HCC): ICD-10-CM

## 2023-06-26 RX ORDER — METOPROLOL TARTRATE 50 MG/1
25 TABLET, FILM COATED ORAL EVERY 12 HOURS SCHEDULED
Qty: 180 TABLET | Refills: 3
Start: 2023-06-26

## 2023-06-26 NOTE — TELEPHONE ENCOUNTER
She has lost weight  He should stop his blood pressure medication I think he is taking at this time olmesartan 40 mg we should stop that    And we can cut back metoprolol to half the dose

## 2023-06-26 NOTE — TELEPHONE ENCOUNTER
Pt complaining of low bp upon standing 96/60  11 pm on Saturday 97/54 bpm 80 was feeling dizzy  pt did have weight loss  247 to 227 in the matter of 3 weeks with change in diet   He is concerned of the low bp and symptoms of dizziness

## 2023-08-11 ENCOUNTER — TELEPHONE (OUTPATIENT)
Dept: FAMILY MEDICINE CLINIC | Facility: CLINIC | Age: 53
End: 2023-08-11

## 2023-08-11 DIAGNOSIS — R73.09 ABNORMAL GLUCOSE: Primary | ICD-10-CM

## 2023-08-11 DIAGNOSIS — I10 BENIGN ESSENTIAL HYPERTENSION: ICD-10-CM

## 2023-08-11 LAB
ALBUMIN SERPL-MCNC: 4.4 G/DL (ref 3.8–4.9)
ALBUMIN/GLOB SERPL: 2 {RATIO} (ref 1.2–2.2)
ALP SERPL-CCNC: 56 IU/L (ref 44–121)
ALT SERPL-CCNC: 17 IU/L (ref 0–44)
AST SERPL-CCNC: 15 IU/L (ref 0–40)
BILIRUB SERPL-MCNC: 0.6 MG/DL (ref 0–1.2)
BUN SERPL-MCNC: 20 MG/DL (ref 6–24)
BUN/CREAT SERPL: 18 (ref 9–20)
CALCIUM SERPL-MCNC: 9 MG/DL (ref 8.7–10.2)
CHLORIDE SERPL-SCNC: 104 MMOL/L (ref 96–106)
CHOLEST SERPL-MCNC: 185 MG/DL (ref 100–199)
CO2 SERPL-SCNC: 23 MMOL/L (ref 20–29)
CREAT SERPL-MCNC: 1.11 MG/DL (ref 0.76–1.27)
EGFR: 79 ML/MIN/1.73
GLOBULIN SER-MCNC: 2.2 G/DL (ref 1.5–4.5)
GLUCOSE SERPL-MCNC: 101 MG/DL (ref 70–99)
HDLC SERPL-MCNC: 54 MG/DL
LDLC SERPL CALC-MCNC: 117 MG/DL (ref 0–99)
MICRODELETION SYND BLD/T FISH: NORMAL
POTASSIUM SERPL-SCNC: 4.2 MMOL/L (ref 3.5–5.2)
PROT SERPL-MCNC: 6.6 G/DL (ref 6–8.5)
SODIUM SERPL-SCNC: 140 MMOL/L (ref 134–144)
TRIGL SERPL-MCNC: 78 MG/DL (ref 0–149)

## 2023-08-11 NOTE — TELEPHONE ENCOUNTER
Jennifer Vela is calling regarding his glucose results. He stated he lost 35 pounds, eating better, not eating sugar and he doesn't know why his glucose went from 96 to 101. He is upset about this and would like someone to explain to him. Please call patient back.  Thank you

## 2023-08-11 NOTE — TELEPHONE ENCOUNTER
8/11/2023 12:38 PM returned call to [facility]     Fasting sugar is up to 101  He has lost 35 lbs over the past 10 weeks. He has cut out carbs.      We discussed reducing risk of getting diabetes  He will keep watching his diet and exercising  Will recheck labs in 6 months  New orders put in chart      Message complete  Ab Hash, DO

## 2023-08-28 ENCOUNTER — CLINICAL SUPPORT (OUTPATIENT)
Dept: FAMILY MEDICINE CLINIC | Facility: CLINIC | Age: 53
End: 2023-08-28
Payer: COMMERCIAL

## 2023-08-28 DIAGNOSIS — Z23 NEED FOR VACCINATION: Primary | ICD-10-CM

## 2023-08-28 PROCEDURE — 90750 HZV VACC RECOMBINANT IM: CPT

## 2023-08-28 PROCEDURE — 90471 IMMUNIZATION ADMIN: CPT

## 2023-09-28 ENCOUNTER — OFFICE VISIT (OUTPATIENT)
Dept: CARDIOLOGY CLINIC | Facility: CLINIC | Age: 53
End: 2023-09-28
Payer: COMMERCIAL

## 2023-09-28 VITALS
SYSTOLIC BLOOD PRESSURE: 110 MMHG | HEART RATE: 83 BPM | HEIGHT: 71 IN | BODY MASS INDEX: 28.56 KG/M2 | WEIGHT: 204 LBS | OXYGEN SATURATION: 97 % | DIASTOLIC BLOOD PRESSURE: 80 MMHG

## 2023-09-28 DIAGNOSIS — R06.83 SNORING: ICD-10-CM

## 2023-09-28 DIAGNOSIS — I10 HYPERTENSION, UNSPECIFIED TYPE: ICD-10-CM

## 2023-09-28 DIAGNOSIS — E78.2 MIXED HYPERLIPIDEMIA: ICD-10-CM

## 2023-09-28 DIAGNOSIS — I48.0 PAROXYSMAL ATRIAL FIBRILLATION (HCC): ICD-10-CM

## 2023-09-28 DIAGNOSIS — E66.9 OBESITY (BMI 30-39.9): ICD-10-CM

## 2023-09-28 PROCEDURE — 93000 ELECTROCARDIOGRAM COMPLETE: CPT | Performed by: INTERNAL MEDICINE

## 2023-09-28 PROCEDURE — 99214 OFFICE O/P EST MOD 30 MIN: CPT | Performed by: INTERNAL MEDICINE

## 2023-09-28 RX ORDER — SODIUM CHLORIDE 9 MG/ML
100 INJECTION, SOLUTION INTRAVENOUS CONTINUOUS
OUTPATIENT
Start: 2023-09-28

## 2023-09-28 RX ORDER — OLMESARTAN MEDOXOMIL 20 MG/1
20 TABLET ORAL DAILY
Qty: 90 TABLET | Refills: 1 | Status: SHIPPED | OUTPATIENT
Start: 2023-09-28

## 2023-09-28 RX ORDER — OLMESARTAN MEDOXOMIL 20 MG/1
20 TABLET ORAL DAILY
COMMUNITY
End: 2023-09-28 | Stop reason: SDUPTHER

## 2023-09-28 NOTE — PROGRESS NOTES
Consultation - Cardiology Office  [facility]. Que Reddy 48 y.o. male MRN: [id number]  : 1970  Unit/Bed#:  Encounter: [id number]      Assessment:     1. Paroxysmal atrial fibrillation (HCC)    2. Hypertension, unspecified type    3. Mixed hyperlipidemia    4. Obesity (BMI 30-39.9)    5. Snoring        Discussion summary and Plan:    1. Paroxysmal atrial fibrillation. Patient is s/p A-fib ablation in [place] in 2021. Maintaining sinus rhythm. He has been on metoprolol and Xarelto. His Yazmin Rico vas score is 1. His risk for stroke is around 1%. He can be managed either with aspirin or Xarelto as per current cholesterol guidelines. We discussed benefits and risk of each strategy. I also discussed the option of placing loop recorder to assess his A-fib burden that will help decrease his risk. He agrees with that plan but would like to think about it. At this time he will continue the same Rx.     2. Hypertension. He has labile hypertension. Partially driven by he lost weight. He was dizzy and lightheaded. We will cut back his olmesartan to 20 mg daily but advised him to take every day and check blood pressure once a day for about 2 weeks. Continue metoprolol as before. 3. Dyslipidemia. Recent blood test shows LDL is above the goal.  His risk for cardiovascular event is to 5% we will continue to monitor. 4.  His BMI now around 28 he has lost a lot of weight. 5. Snoring with atrial arrhythmias. Patient has not done home sleep study. He is reluctant to do home study at this time. 6. Erectile dysfunction. On Viagra as needed    Plan. Continue current Rx. Antithrombotic therapy for A-fib discussed at length. Will order loop recorder for A-fib burden and better management of his antithrombotic therapy. He agreed with the plan. Olmesartan cut back.           Patient was advised and educated to call our office  immediately if  patient has any new symptoms of chest pain/shortness of breath, near-syncope, syncope, light headedness sustained palpitations or any other cardiovascular symptoms before their scheduled follow-up appointment. Office number was provided #[phone]. Thank you for your consultation. If you have any question please call me at [phone]    Counseling :  A description of the counseling. Goals and Barriers. Patient's ability to self care: Yes  Medication side effect reviewed with patient in detail and all their questions answered to their satisfaction. Primary Care Physician : Jhoan Genao DO      HPI :     Radha Pruitt is a 48y.o. year old male who was referred by primary care doctor for history of paroxysmal atrial fibrillation hypertension. Patient used to live in [facility] and had episode of atrial fibrillation at that time which was suppressed with flecainide he moved to [facility] and started to get later on multiple episodes of atrial fibrillation. He was evaluated by EP in  [facility] and he had EP workup done in the form of ablation in September 2021. Since then he is maintaining sinus rhythm. Occasionally still get episodes of fast heartbeat. He has been on Xarelto and metoprolol 50 twice a day. He was recommended sleep study he has not done it yet. He did develop erectile dysfunction with beta-blocker has been taking Viagra as needed. Sometime he misses beta-blocker. Today he was rushing to our office his heart rate on arrival was found to be elevated around 160 beats per minute. His previous cardiac workup reviewed. And note from EP and other note reviewed. He had normal LV systolic function. He had a previously stress echo which was normal and CTA for coronary was normal.  His palpitations which he gets occasionally was thought to be PACs and PVCs and they have ordered monitor for him which he was done recently. He denies any nausea and vomiting he does snore. He does not drink much. No other cardiovascular issues. 3/10/2023. Above reviewed. Patient came for follow-up. He has a medical history significant for paroxysmal atrial fibrillation s/p ablation in [facility]  and has been on antithrombotic therapy. Ablation was done in September 2021. He is maintaining sinus rhythm. He has no new complaints. He has normal LV systolic function. Previous echo as well as CT of the coronary was normal.  He does not get any palpitations EK today shows sinus them heart rate 74 bpm recently blood pressure was cholesterol was slightly elevated and his risk for cardiovascular event is around 5 to 6%. He is on dietary control for now. 9/28/2023. Above reviewed. Patient came for follow-up he is doing well. He had lost about 40 pounds. His blood pressure was low he cut back on his olmesartan. He was missing on weekend doses today blood pressure is 110/80. He does not feel dizzy but he feels dizzy when blood pressure goes to 90. He had A-fib ablation in [facility]. Since then he has maintained sinus rhythm. He has been managed with Xarelto. His Yazmin Rico Vasc score is 1 and his risk for stroke is around 1 to 1.3%. He can be managed both either with aspirin or Xarelto with risk and benefits of each strategy. His blood work reveals his cholesterol has improved he feels otherwise well he does get easily anxious and no other cardiovascular issues        Review of Systems   Constitutional: Negative for activity change, chills, diaphoresis, fever and unexpected weight change. HENT: Negative for congestion. Eyes: Negative for discharge and redness. Respiratory: Negative for cough, chest tightness, shortness of breath and wheezing. Cardiovascular: Negative. Negative for chest pain, palpitations and leg swelling. Gastrointestinal: Negative for abdominal pain, diarrhea and nausea. Endocrine: Negative. Genitourinary: Negative for decreased urine volume and urgency. Musculoskeletal: Negative. Negative for arthralgias, back pain and gait problem. Skin: Negative for rash and wound. Allergic/Immunologic: Negative. Neurological: Negative for dizziness, seizures, syncope, weakness, light-headedness and headaches. Hematological: Negative. Psychiatric/Behavioral: Negative for agitation and confusion. The patient is not nervous/anxious. Historical Information   Past Medical History:   Diagnosis Date   • Atrial fibrillation ([address])    • HTN (hypertension)      Past Surgical History:   Procedure Laterality Date   • CARDIAC ELECTROPHYSIOLOGY STUDY AND ABLATION     • CLAVICLE SURGERY Left      Social History     Substance and Sexual Activity   Alcohol Use Yes   • Alcohol/week: 1.0 standard drink of alcohol   • Types: 1 Cans of beer per week    Comment: few weekly     Social History     Substance and Sexual Activity   Drug Use Never     Social History     Tobacco Use   Smoking Status Former   • Packs/day: 0.50   • Years: 5.00   • Total pack years: 2.50   • Types: Cigarettes   • Start date: 5   • Quit date: 200   • Years since quittin.7   Smokeless Tobacco Never     Family History:   Family History   Problem Relation Age of Onset   • Atrial fibrillation Father        Meds/Allergies     No Known Allergies    Current Outpatient Medications:   •  metoprolol tartrate (LOPRESSOR) 50 mg tablet, Take 0.5 tablets (25 mg total) by mouth every 12 (twelve) hours, Disp: 180 tablet, Rfl: 3  •  rivaroxaban (XARELTO) 20 mg tablet, Take 20 mg by mouth, Disp: , Rfl:   •  sildenafil (VIAGRA) 100 mg tablet, TAKE ONE TABLET BY MOUTH AS NEEDED, Disp: 6 tablet, Rfl: 0    Vitals: Blood pressure 110/80, pulse 83, height 5' 11" (1.803 m), weight 92.5 kg (204 lb), SpO2 97 %. ?  Body mass index is 28.45 kg/m².   Wt Readings from Last 3 Encounters:   23 92.5 kg (204 lb)   23 104 kg (229 lb)   03/10/23 111 kg (245 lb)     Vitals:    23 1313   Weight: 92.5 kg (204 lb)     BP Readings from Last 3 Encounters:   09/28/23 110/80   06/17/23 120/70   03/10/23 132/86         Physical Exam  Constitutional:       General: He is not in acute distress. Appearance: He is well-developed. He is not diaphoretic. Neck:      Thyroid: No thyromegaly. Vascular: No JVD. Trachea: No tracheal deviation. Cardiovascular:      Rate and Rhythm: Normal rate and regular rhythm. Heart sounds: S1 normal and S2 normal. Heart sounds not distant. Murmur heard. Systolic (ejection) murmur is present with a grade of 2/6. No friction rub. No gallop. No S3 or S4 sounds. Pulmonary:      Effort: Pulmonary effort is normal. No respiratory distress. Breath sounds: Normal breath sounds. No wheezing or rales. Chest:      Chest wall: No tenderness. Abdominal:      General: Bowel sounds are normal. There is no distension. Palpations: Abdomen is soft. Tenderness: There is no abdominal tenderness. Musculoskeletal:         General: No deformity. Cervical back: Neck supple. Skin:     General: Skin is warm and dry. Coloration: Skin is not pale. Findings: No rash. Neurological:      Mental Status: He is alert and oriented to person, place, and time. Psychiatric:         Behavior: Behavior normal.         Judgment: Judgment normal.           Diagnostic Studies Review Cardio:  Echocardiogram done in [facility] on 07/19/2021 was read as EF 65%. With exercise EF improved to more than 70%. Low risk treadmill exercise echocardiogram as per report. NATALIE done 09/08/2021 shows patient has no left atrial appendage thrombus. EF was normal.  Interatrial septum was intact. No significant valvular disease was mention and ascending and descending aorta was free from atherosclerosis as per report. Transthoracic echo 09/08/2021 shows EF 65%, no significant valvular disease.     EKG:  Twelve lead EKG done 09/09/2022 shows sinus tachycardia heart rate 116 beats per minute. As per patient this is generally ablation as he was running late and he was running to our office generally heart rate is around 80 to 90s. Twelve-lead EKG 3/10/2023 shows normal sinus some heart rate 74 bpm it is a normal EKG. Twelve-lead EKG done on 9/28/2023 shows sinus rhythm with a heart rate 83 bpm no other significant normality. Lab Review   Lab Results   Component Value Date    WBC 5.3 03/03/2023    HGB 15.2 03/03/2023    HCT 44.8 03/03/2023    MCV 90 03/03/2023    RDW 12.5 03/03/2023     03/03/2023     BMP:  Lab Results   Component Value Date    SODIUM 140 08/10/2023    K 4.2 08/10/2023     08/10/2023    CO2 23 08/10/2023    BUN 20 08/10/2023    CREATININE 1.11 08/10/2023    GLUC 101 (H) 08/10/2023    CALCIUM 9.4 03/18/2016    EGFR 79 08/10/2023     Troponins:    LFT:  Lab Results   Component Value Date    AST 15 08/10/2023    ALT 17 08/10/2023    ALKPHOS 65 03/18/2016    TP 6.6 08/10/2023    ALB 4.4 08/10/2023      Lab Results   Component Value Date    BXE9QEXGTMRP 1.770 03/18/2016     Lipid Profile:   Lab Results   Component Value Date    CHOLESTEROL 185 08/10/2023    HDL 54 08/10/2023    LDLCALC 117 (H) 08/10/2023    TRIG 78 08/10/2023     The 10-year ASCVD risk score (Zaynab DK, et al., 2019) is: 3.5%    Values used to calculate the score:      Age: 48 years      Sex: Male      Is Non- : No      Diabetic: No      Tobacco smoker: No      Systolic Blood Pressure: 055 mmHg      Is BP treated: Yes      HDL Cholesterol: 54 mg/dL      Total Cholesterol: 185 mg/dL      Dr. Lamar Hennessy MD [facility]      "This note was completed in part utilizing m-modal fluency direct voice recognition software. Grammatical errors, random word insertion, spelling mistakes, and incomplete sentences may be an occasional consequence of the system secondary to software limitations, ambient noise and hardware issues.     Please read the chart carefully and recognize, using context, where substitutions have occurred.   If you have any questions or concerns about the context, text or information contained within the body of this dictation, please contact myself, the provider, for further clarification."

## 2023-09-29 ENCOUNTER — TELEPHONE (OUTPATIENT)
Dept: CARDIOLOGY CLINIC | Facility: CLINIC | Age: 53
End: 2023-09-29

## 2023-09-29 NOTE — TELEPHONE ENCOUNTER
Dr Rudd ordered a loop recorder implant and the patient is trying to find out his out of pocket cost. I encouraged him to call  but they needed the CPT code for it. Whatever assistance you can provide would be much appreciated.  Thanks.

## 2023-09-29 NOTE — TELEPHONE ENCOUNTER
Patient called today regarding discussion for changing xarelto to aspirin. Is there like a hold time between switching or does he just stop taking it and start aspirin the next day?

## 2023-10-18 DIAGNOSIS — I48.91 ATRIAL FIBRILLATION, UNSPECIFIED TYPE (HCC): ICD-10-CM

## 2023-10-18 DIAGNOSIS — I10 HYPERTENSION, UNSPECIFIED TYPE: ICD-10-CM

## 2023-10-18 RX ORDER — METOPROLOL TARTRATE 50 MG/1
50 TABLET, FILM COATED ORAL 2 TIMES DAILY
Qty: 180 TABLET | Refills: 0 | Status: SHIPPED | OUTPATIENT
Start: 2023-10-18

## 2023-11-09 DIAGNOSIS — I48.91 ATRIAL FIBRILLATION, UNSPECIFIED TYPE (HCC): Primary | ICD-10-CM

## 2023-11-09 RX ORDER — RIVAROXABAN 20 MG/1
20 TABLET, FILM COATED ORAL DAILY
Qty: 90 TABLET | Refills: 1 | Status: SHIPPED | OUTPATIENT
Start: 2023-11-09

## 2023-11-17 DIAGNOSIS — N52.9 ERECTILE DYSFUNCTION, UNSPECIFIED ERECTILE DYSFUNCTION TYPE: ICD-10-CM

## 2023-11-17 RX ORDER — SILDENAFIL 100 MG/1
TABLET, FILM COATED ORAL
Qty: 6 TABLET | Refills: 0 | Status: SHIPPED | OUTPATIENT
Start: 2023-11-17

## 2023-12-07 ENCOUNTER — OFFICE VISIT (OUTPATIENT)
Dept: FAMILY MEDICINE CLINIC | Facility: CLINIC | Age: 53
End: 2023-12-07
Payer: COMMERCIAL

## 2023-12-07 VITALS
HEART RATE: 78 BPM | HEIGHT: 71 IN | RESPIRATION RATE: 18 BRPM | SYSTOLIC BLOOD PRESSURE: 130 MMHG | BODY MASS INDEX: 28.25 KG/M2 | WEIGHT: 201.8 LBS | TEMPERATURE: 97.5 F | DIASTOLIC BLOOD PRESSURE: 70 MMHG

## 2023-12-07 DIAGNOSIS — R10.32 LEFT INGUINAL PAIN: ICD-10-CM

## 2023-12-07 DIAGNOSIS — R30.0 DYSURIA: Primary | ICD-10-CM

## 2023-12-07 LAB
SL AMB  POCT GLUCOSE, UA: NEGATIVE
SL AMB LEUKOCYTE ESTERASE,UA: NEGATIVE
SL AMB POCT BILIRUBIN,UA: NEGATIVE
SL AMB POCT BLOOD,UA: NEGATIVE
SL AMB POCT CLARITY,UA: CLEAR
SL AMB POCT COLOR,UA: YELLOW
SL AMB POCT KETONES,UA: NEGATIVE
SL AMB POCT NITRITE,UA: NEGATIVE
SL AMB POCT PH,UA: 6
SL AMB POCT SPECIFIC GRAVITY,UA: >=1.03
SL AMB POCT URINE PROTEIN: NEGATIVE
SL AMB POCT UROBILINOGEN: 0.2

## 2023-12-07 PROCEDURE — 81003 URINALYSIS AUTO W/O SCOPE: CPT | Performed by: NURSE PRACTITIONER

## 2023-12-07 PROCEDURE — 99213 OFFICE O/P EST LOW 20 MIN: CPT | Performed by: NURSE PRACTITIONER

## 2023-12-07 RX ORDER — CIPROFLOXACIN 500 MG/1
500 TABLET, FILM COATED ORAL EVERY 12 HOURS SCHEDULED
Qty: 14 TABLET | Refills: 0 | Status: SHIPPED | OUTPATIENT
Start: 2023-12-07 | End: 2023-12-14

## 2023-12-07 NOTE — PATIENT INSTRUCTIONS
Ciprofloxacin (By mouth)   Ciprofloxacin ([id number]-[id number]-PYTL-a-sin)  Treats infections and plague (including pneumonic and septicemic plague). It is also given to people who have been exposed to anthrax. This medicine is a quinolone antibiotic. Brand Name(s): Cipro   There may be other brand names for this medicine. When This Medicine Should Not Be Used: This medicine is not right for everyone. Do not use it if you had an allergic reaction to ciprofloxacin or to similar medicines. How to Use This Medicine:   Liquid, Tablet, Long Acting Tablet  Your doctor will tell you how much medicine to use. Do not use more than directed. Take this medicine at the same time each day. You may take this medicine with or without food. Do not take this medicine with only a source of calcium, including milk, yogurt, or juice that contains added calcium. You may have foods or drinks that contain calcium as part of a larger meal.  Swallow the extended-release tablet whole. Do not crush, break, or chew it. Oral liquid: Shake for at least 15 seconds just before each use. The liquid has small beads floating in it. Do not chew the beads when you drink the liquid. Measure the oral liquid medicine with a marked measuring spoon, oral syringe, or medicine cup. Tablet: You may swallow the tablet whole or break it in half at the score line. Do not crush or chew it. Tell your doctor if you have trouble swallowing the tablet. Drink extra fluids so you will urinate more often and help prevent kidney problems. Take all of the medicine in your prescription to clear up your infection, even if you feel better after the first few doses. This medicine should come with a Medication Guide. Ask your pharmacist for a copy if you do not have one. Missed dose: If you miss a dose of the oral liquid or tablet and it is 6 hours or more until your next regular dose, take the missed dose as soon as possible, and then go back to your regular schedule.  If you miss a dose and it is less than 6 hours until your next regular dose, skip the missed dose and take your next dose at the regular time. Store the medicine in a closed container at room temperature, away from heat, moisture, and direct light. Throw away any leftover liquid medicine after 14 days. Drugs and Foods to Avoid:   Ask your doctor or pharmacist before using any other medicine, including over-the-counter medicines, vitamins, and herbal products. Do not use this medicine together with tizanidine. Some foods and medicines can affect how ciprofloxacin works.  Tell your doctor if you are using any of the following:  Clozapine, cyclosporine, duloxetine, lidocaine, methotrexate, olanzapine, pentoxifylline, phenytoin, probenecid, ropinirole, sildenafil, theophylline, zolpidem  Antibiotic (including azithromycin, clarithromycin, erythromycin)  Blood thinner (including warfarin)  Insulin or oral diabetes medicine (including glimepiride, glyburide)  Medicine for depression or mental illness  Medicine for heart rhythm problems (including amiodarone, procainamide, quinidine, sotalol)  NSAID pain medicine (including aspirin, celecoxib, diclofenac, ibuprofen, naproxen)  Steroid medicine (including hydrocortisone, methylprednisolone, prednisone)  Take ciprofloxacin at least 2 hours before or 6 hours after you take didanosine buffered tablets for oral suspension or the pediatric powder for oral suspension, sucralfate, or antacids, multivitamins, or other products containing aluminum, magnesium, lanthanum, sevelamer, iron, or zinc.  This medicine slows the digestion of caffeine, so it might affect you for longer than normal.  Warnings While Using This Medicine:   Tell your doctor if you are pregnant or planning to become pregnant, or if you have kidney disease, liver disease, diabetes, heart disease, myasthenia gravis, aortic aneurysm (bulge in the wall of the largest artery), or a history of heart rhythm problems (including prolonged QT interval), electrolyte imbalance, nerve problems, seizures, brain problems, stroke, or mental illness. Tell your doctor if you have ever had tendon or joint problems, including rheumatoid arthritis, or if you have received a transplant. Your doctor may tell you to stop breastfeeding, and pump and discard your breast milk during treatment and for at least 2 days after your final dose. This medicine may cause the following problems:  Tendinitis and tendon rupture (which may happen after treatment ends)  Nerve damage in the arms or legs, which may become permanent  Changes in mood or behavior, seizures, or increased pressure in the head  Serious skin reactions  Kidney problems  Liver problems  Increased risk of aortic aneurysm  Heart rhythm changes  Changes in blood sugar levels  This medicine may make you dizzy, drowsy, or lightheaded. Do not drive or do anything else that could be dangerous until you know how this medicine affects you. This medicine may make you bleed, bruise, or get infections more easily. Take precautions to prevent illness and injury. Wash your hands often. This medicine can cause diarrhea. Call your doctor if the diarrhea becomes severe, does not stop, or is bloody. Do not take any medicine to stop diarrhea until you have talked to your doctor. Diarrhea can occur 2 months or more after you stop taking this medicine. Tell any doctor or dentist who treats you that you are using this medicine. This medicine may affect certain medical test results. This medicine may make your skin more sensitive to sunlight. Wear sunscreen. Do not use sunlamps or tanning beds. Call your doctor if your symptoms do not improve or if they get worse. Your doctor will do lab tests at regular visits to check on the effects of this medicine. Keep all appointments. Keep all medicine out of the reach of children. Never share your medicine with anyone.   Possible Side Effects While Using This Medicine:   Call your doctor right away if you notice any of these side effects: Allergic reaction: Itching or hives, swelling in your face or hands, swelling or tingling in your mouth or throat, chest tightness, trouble breathing  Blistering, peeling, red skin rash  Change in how much or how often you urinate, cloudy or bloody urine  Dark urine, pale stools, nausea, vomiting, loss of appetite, stomach pain, yellow skin or eyes  Diarrhea which may contain blood  Fainting, dizziness, or lightheadedness  Fast, slow, or uneven heartbeat  Numbness, tingling, weakness, or burning pain in your hands, arms, legs, or feet  Pain, stiffness, swelling, or bruises around your ankle, leg, shoulder, or other joints  Seizures, severe headache, unusual thoughts or behaviors, trouble sleeping, feeling anxious, confused, or depressed, seeing, hearing, or feeling things that are not there  Sensitivity of the skin to sunlight, redness or other discoloration of the skin, severe sunburn  Sudden chest, stomach, or back pain, trouble breathing, cough  Unusual bleeding, bruising, or weakness  If you notice other side effects that you think are caused by this medicine, tell your doctor. Call your doctor for medical advice about side effects. You may report side effects to FDA at 1-837-FDA-6639  © Copyright [employer] 2023 Information is for End User's use only and may not be sold, redistributed or otherwise used for commercial purposes. The above information is an  only. It is not intended as medical advice for individual conditions or treatments. Talk to your doctor, nurse or pharmacist before following any medical regimen to see if it is safe and effective for you.

## 2023-12-07 NOTE — PROGRESS NOTES
Assessment/Plan:    1. Dysuria  -     ciprofloxacin (CIPRO) 500 mg tablet; Take 1 tablet (500 mg total) by mouth every 12 (twelve) hours for 7 days  -     Ambulatory Referral to Urology; Future  -     POCT urine dip auto non-scope  -     Urine culture    2. Left inguinal pain  -     POCT urine dip auto non-scope  -     Urine culture      Patient Instructions: Take antibiotics until finished with food. Drink plenty of fluids. Follow up advised for persistent urinary symptoms or if you develop flank pain, fevers, nausea, or vomiting. Please call the office if symptoms do not improve after completion of the antibiotics. Supportive care discussed and advised. Advised to RTO for any worsening and no improvement. Follow up for no improvement and worsening of conditions. Patient advised and educated when to see immediate medical care. Return if symptoms worsen or fail to improve. No future appointments. Subjective:      Patient ID: Meagan Reis is a 48 y.o. male. Chief Complaint   Patient presents with   • prostate infection     Has had 2 times in the past, symptoms started 5 days ago Radha Giraldo LPN           Vitals:  /70   Pulse 78   Temp 97.5 °F (36.4 °C)   Resp 18   Ht 5' 11" (1.803 m)   Wt 91.5 kg (201 lb 12.8 oz)   BMI 28.15 kg/m²     HPI  Patient stated that started with discomfort with urination and left inguinal pain couple of days ago and stated that happened in June 2023 and was treated for prostatitis and also happened before that and was diagnosed with UTI. Stated that his symptoms are consistent with prostatitis. Currently sexually active 3 to 4 times a week and denies any new sexual partners.  Denies fever, chills and sob  Discussed with patient that needs to follow with urologist for recurrent UTI/prostatitis            The following portions of the patient's history were reviewed and updated as appropriate: allergies, current medications, past family history, past medical history, past social history, past surgical history and problem list.      Review of Systems   Constitutional:  Negative for chills, diaphoresis, fatigue, fever and unexpected weight change. Respiratory: Negative. Cardiovascular: Negative. Gastrointestinal:  Negative for abdominal pain, nausea and vomiting. Genitourinary:  Positive for dysuria. Negative for decreased urine volume, flank pain, frequency, genital sores, hematuria, testicular pain and urgency. As noted in HPI       Musculoskeletal: Negative. Skin: Negative. Neurological:  Negative for dizziness and headaches. Objective:    Social History     Tobacco Use   Smoking Status Former   • Packs/day: 0.50   • Years: 5.00   • Total pack years: 2.50   • Types: Cigarettes   • Start date: 5   • Quit date: 200   • Years since quittin.9   Smokeless Tobacco Never       Allergies: No Known Allergies      Current Outpatient Medications   Medication Sig Dispense Refill   • ciprofloxacin (CIPRO) 500 mg tablet Take 1 tablet (500 mg total) by mouth every 12 (twelve) hours for 7 days 14 tablet 0   • metoprolol tartrate (LOPRESSOR) 50 mg tablet TAKE ONE TABLET BY MOUTH TWICE A  tablet 0   • olmesartan (BENICAR) 20 mg tablet Take 1 tablet (20 mg total) by mouth daily 90 tablet 1   • sildenafil (VIAGRA) 100 mg tablet TAKE ONE TABLET BY MOUTH AS NEEDED 6 tablet 0   • Xarelto 20 MG tablet TAKE ONE TABLET BY MOUTH DAILY 90 tablet 1     No current facility-administered medications for this visit. Physical Exam  Constitutional:       Appearance: Normal appearance. He is well-developed. Cardiovascular:      Rate and Rhythm: Normal rate and regular rhythm. Pulmonary:      Effort: Pulmonary effort is normal.   Abdominal:      General: Bowel sounds are normal. There is no distension. Palpations: Abdomen is soft. Tenderness: There is no abdominal tenderness. There is no rebound.       Hernia: There is no hernia in the left inguinal area or right inguinal area. Lymphadenopathy:      Lower Body: No right inguinal adenopathy. No left inguinal adenopathy. Skin:     General: Skin is warm and dry. Neurological:      Mental Status: He is alert and oriented to person, place, and time. Psychiatric:         Behavior: Behavior normal.         Thought Content:  Thought content normal.         Judgment: Judgment normal.               Recent Results (from the past 24 hour(s))   POCT urine dip auto non-scope    Collection Time: 12/07/23 12:00 PM   Result Value Ref Range     COLOR,UA yellow     CLARITY,UA clear     SPECIFIC GRAVITY,UA >=1.030      PH,UA 6.0     LEUKOCYTE ESTERASE,UA negative     NITRITE,UA negative     GLUCOSE, UA negative     KETONES,UA negative     BILIRUBIN,UA negative     BLOOD,UA negative     POCT URINE PROTEIN negative     SL AMB POCT UROBILINOGEN 0.2            ANDIE Alvarez

## 2023-12-09 LAB
BACTERIA UR CULT: NORMAL
Lab: NO GROWTH

## 2023-12-12 NOTE — PROGRESS NOTES
Erica Hatfield is a(n) 48 y.o. male. , :  1970    Subjective   Chief Complaint: No chief complaint on file. Diagnoses: There were no encounter diagnoses. Assessment/Plan  ***    There are no Patient Instructions on file for this visit.  Radiology  ***  ***     History  ***      Prior Visits  ***    2023 [facility]  ***      No Known Allergies    Review of Systems    Past Surgical History:   Procedure Laterality Date    CARDIAC ELECTROPHYSIOLOGY STUDY AND ABLATION      CLAVICLE SURGERY Left        Family History   Problem Relation Age of Onset    Atrial fibrillation Father        Social History     Socioeconomic History    Marital status: /Civil Union     Spouse name: Not on file    Number of children: Not on file    Years of education: Not on file    Highest education level: Not on file   Occupational History    Not on file   Tobacco Use    Smoking status: Former     Packs/day: 0.50     Years: 5.00     Total pack years: 2.50     Types: Cigarettes     Start date: 5     Quit date: 200     Years since quittin.9    Smokeless tobacco: Never   Vaping Use    Vaping Use: Never used   Substance and Sexual Activity    Alcohol use:  Yes     Alcohol/week: 1.0 standard drink of alcohol     Types: 1 Cans of beer per week     Comment: few weekly    Drug use: Never    Sexual activity: Not on file   Other Topics Concern    Not on file   Social History Narrative    Not on file     Social Determinants of Health     Financial Resource Strain: Not on file   Food Insecurity: Not on file   Transportation Needs: Not on file   Physical Activity: Not on file   Stress: Not on file   Social Connections: Not on file   Intimate Partner Violence: Not on file   Housing Stability: Not on file         Current Outpatient Medications:     ciprofloxacin (CIPRO) 500 mg tablet, Take 1 tablet (500 mg total) by mouth every 12 (twelve) hours for 7 days, Disp: 14 tablet, Rfl: 0    metoprolol tartrate (LOPRESSOR) 50 mg tablet, TAKE ONE TABLET BY MOUTH TWICE A DAY, Disp: 180 tablet, Rfl: 0    olmesartan (BENICAR) 20 mg tablet, Take 1 tablet (20 mg total) by mouth daily, Disp: 90 tablet, Rfl: 1    sildenafil (VIAGRA) 100 mg tablet, TAKE ONE TABLET BY MOUTH AS NEEDED, Disp: 6 tablet, Rfl: 0    Xarelto 20 MG tablet, TAKE ONE TABLET BY MOUTH DAILY, Disp: 90 tablet, Rfl: 1    Objective     There were no vitals taken for this visit.     Physical Exam      [facility] - [place]

## 2023-12-12 NOTE — PROGRESS NOTES
Mari Henao is a(n) 48 y.o. male. , :  1970    Subjective   Chief Complaint:   Chief Complaint   Patient presents with    Elevated PSA     Diagnoses: There were no encounter diagnoses. Assessment/Plan  Probable prostatitis with low back pain and under the left scrotum pain. No testis pain. On cipro but not likely doing an abx effect rather anti-inflammatory. PSA is ordered for 2024 for primary care. Patient Instructions   My suspicion is that this is likely prostatitis. Probably from hiking and possibly having a full bladder while hiking. You should try to avoid hiking with a full bladder. You should probably increase ejaculation especially after a long hike. There might be some congestion of the prostate from that. Especially now since you have a flareup of prostatitis it is probably best to try to ejaculate every day. Sometimes warm sits baths are helpful. Also anti-inflammatories like Advil 600 mg twice a day with meals. This is something you will have to check with the cardiologist to see if it is acceptable since you are on Xarelto. I think the risk of having a spontaneous bleed somewhere is unlikely at your age but best to check with the cardiologist.  If this happens again feel free to come back and see us. This should resolve within a couple of weeks.  Radiology  23 scrotal ultrasound  IMPRESSION:  Small bilateral varicoceles. Otherwise normal scrotal sonogram     History  Possibly prostatitis  Family hx of prostate cancer in father - no surgery. Cardiac issues since his 25s    Prior Visits  None    2023 [facility]  24-year-old gentleman here for dysuria. Had urine culture done 2023 which showed no evidence of infection. Low back and possibly muscular complaints favoring the right side. Similar episodes in [place]. Prostatitis. Got abx and improved. Voids ok. Minimal ED. No MRI or lumbar xrays. Tingling  in feet some times. Sits 12 hours a day. No weakness. On xarelto so can not use ibuprofen  Hikes for hours. Could be congestion and chemical.    Physical exam  The prostate was slightly enlarged. Definitely soft and congested. Not really tender. Lab Results   Component Value Date    PSA 0.6 2023    PSA 0.5 2016     No components found for: "CR"    No Known Allergies    Review of Systems    Past Surgical History:   Procedure Laterality Date    CARDIAC ELECTROPHYSIOLOGY STUDY AND ABLATION      CLAVICLE SURGERY Left        Family History   Problem Relation Age of Onset    Atrial fibrillation Father        Social History     Socioeconomic History    Marital status: /Civil Union     Spouse name: Not on file    Number of children: Not on file    Years of education: Not on file    Highest education level: Not on file   Occupational History    Not on file   Tobacco Use    Smoking status: Former     Current packs/day: 0.00     Average packs/day: 0.5 packs/day for 5.0 years (2.5 ttl pk-yrs)     Types: Cigarettes     Start date: 5     Quit date: 200     Years since quittin.9    Smokeless tobacco: Never   Vaping Use    Vaping status: Never Used   Substance and Sexual Activity    Alcohol use:  Yes     Alcohol/week: 1.0 standard drink of alcohol     Types: 1 Cans of beer per week     Comment: few weekly    Drug use: Never    Sexual activity: Not on file   Other Topics Concern    Not on file   Social History Narrative    Not on file     Social Determinants of Health     Financial Resource Strain: Not on file   Food Insecurity: Not on file   Transportation Needs: Not on file   Physical Activity: Not on file   Stress: Not on file   Social Connections: Not on file   Intimate Partner Violence: Not on file   Housing Stability: Not on file         Current Outpatient Medications:     ciprofloxacin (CIPRO) 500 mg tablet, Take 1 tablet (500 mg total) by mouth every 12 (twelve) hours for 7 days, Disp: 14 tablet, Rfl: 0    metoprolol tartrate (LOPRESSOR) 50 mg tablet, TAKE ONE TABLET BY MOUTH TWICE A DAY, Disp: 180 tablet, Rfl: 0    olmesartan (BENICAR) 20 mg tablet, Take 1 tablet (20 mg total) by mouth daily, Disp: 90 tablet, Rfl: 1    sildenafil (VIAGRA) 100 mg tablet, TAKE ONE TABLET BY MOUTH AS NEEDED, Disp: 6 tablet, Rfl: 0    Xarelto 20 MG tablet, TAKE ONE TABLET BY MOUTH DAILY, Disp: 90 tablet, Rfl: 1    Objective     /78 (BP Location: Left arm, Patient Position: Sitting, Cuff Size: Large)   Pulse 75   Resp 16   Ht 5' 11" (1.803 m)   Wt 91.7 kg (202 lb 3.2 oz)   SpO2 100%   BMI 28.20 kg/m²     Physical Exam      [facility] - [place]

## 2023-12-13 ENCOUNTER — TELEPHONE (OUTPATIENT)
Dept: CARDIOLOGY CLINIC | Facility: CLINIC | Age: 53
End: 2023-12-13

## 2023-12-13 ENCOUNTER — TELEPHONE (OUTPATIENT)
Dept: UROLOGY | Facility: CLINIC | Age: 53
End: 2023-12-13

## 2023-12-13 ENCOUNTER — OFFICE VISIT (OUTPATIENT)
Dept: UROLOGY | Facility: CLINIC | Age: 53
End: 2023-12-13
Payer: COMMERCIAL

## 2023-12-13 VITALS
RESPIRATION RATE: 16 BRPM | HEART RATE: 75 BPM | BODY MASS INDEX: 28.31 KG/M2 | DIASTOLIC BLOOD PRESSURE: 78 MMHG | HEIGHT: 71 IN | SYSTOLIC BLOOD PRESSURE: 128 MMHG | WEIGHT: 202.2 LBS | OXYGEN SATURATION: 100 %

## 2023-12-13 DIAGNOSIS — N41.1 CHRONIC PROSTATITIS: Primary | ICD-10-CM

## 2023-12-13 PROCEDURE — 99203 OFFICE O/P NEW LOW 30 MIN: CPT | Performed by: UROLOGY

## 2023-12-13 NOTE — TELEPHONE ENCOUNTER
Pt called states he went to urologist today for a bladder infection, they said he could benefit from advil for the inflammation.  Pt was advised to check with you first since he is on xarelto

## 2023-12-13 NOTE — PATIENT INSTRUCTIONS
My suspicion is that this is likely prostatitis. Probably from hiking and possibly having a full bladder while hiking. You should try to avoid hiking with a full bladder. You should probably increase ejaculation especially after a long hike. There might be some congestion of the prostate from that. Especially now since you have a flareup of prostatitis it is probably best to try to ejaculate every day. Sometimes warm sits baths are helpful. Also anti-inflammatories like Advil 600 mg twice a day with meals. This is something you will have to check with the cardiologist to see if it is acceptable since you are on Xarelto. I think the risk of having a spontaneous bleed somewhere is unlikely at your age but best to check with the cardiologist.  If this happens again feel free to come back and see us. This should resolve within a couple of weeks.

## 2023-12-14 NOTE — TELEPHONE ENCOUNTER
He cannot take Advil there is no cardiac contraindication to take Advil but he should not be taking it for a long. Of time.

## 2023-12-31 ENCOUNTER — HOSPITAL ENCOUNTER (OUTPATIENT)
Facility: HOSPITAL | Age: 53
Setting detail: OBSERVATION
Discharge: HOME | End: 2024-01-01
Attending: STUDENT IN AN ORGANIZED HEALTH CARE EDUCATION/TRAINING PROGRAM | Admitting: STUDENT IN AN ORGANIZED HEALTH CARE EDUCATION/TRAINING PROGRAM
Payer: COMMERCIAL

## 2023-12-31 DIAGNOSIS — K92.2 GI BLEED: Primary | ICD-10-CM

## 2023-12-31 DIAGNOSIS — K62.5 RECTAL BLEEDING: ICD-10-CM

## 2023-12-31 PROBLEM — R07.89 ATYPICAL CHEST PAIN: Status: ACTIVE | Noted: 2023-12-31

## 2023-12-31 LAB
2HR DELTA HS TROPONIN: >0 NG/L
ALBUMIN SERPL BCP-MCNC: 4.1 G/DL (ref 3.5–5)
ALP SERPL-CCNC: 41 U/L (ref 34–104)
ALT SERPL W P-5'-P-CCNC: 16 U/L (ref 7–52)
ANION GAP SERPL CALCULATED.3IONS-SCNC: 6 MMOL/L
APTT PPP: 37 SECONDS (ref 23–37)
AST SERPL W P-5'-P-CCNC: 14 U/L (ref 13–39)
BASOPHILS # BLD AUTO: 0.03 THOUSANDS/ÂΜL (ref 0–0.1)
BASOPHILS NFR BLD AUTO: 0 % (ref 0–1)
BILIRUB SERPL-MCNC: 0.44 MG/DL (ref 0.2–1)
BUN SERPL-MCNC: 33 MG/DL (ref 5–25)
CALCIUM SERPL-MCNC: 8.6 MG/DL (ref 8.4–10.2)
CARDIAC TROPONIN I PNL SERPL HS: 2 NG/L
CARDIAC TROPONIN I PNL SERPL HS: <2 NG/L
CARDIAC TROPONIN I PNL SERPL HS: <2 NG/L
CHLORIDE SERPL-SCNC: 103 MMOL/L (ref 96–108)
CO2 SERPL-SCNC: 27 MMOL/L (ref 21–32)
CREAT SERPL-MCNC: 1.2 MG/DL (ref 0.6–1.3)
EOSINOPHIL # BLD AUTO: 0.06 THOUSAND/ÂΜL (ref 0–0.61)
EOSINOPHIL NFR BLD AUTO: 1 % (ref 0–6)
ERYTHROCYTE [DISTWIDTH] IN BLOOD BY AUTOMATED COUNT: 13.3 % (ref 11.6–15.1)
GFR SERPL CREATININE-BSD FRML MDRD: 68 ML/MIN/1.73SQ M
GLUCOSE SERPL-MCNC: 116 MG/DL (ref 65–140)
HCT VFR BLD AUTO: 43.5 % (ref 36.5–49.3)
HCT VFR BLD AUTO: 45.2 % (ref 36.5–49.3)
HGB BLD-MCNC: 15 G/DL (ref 12–17)
HGB BLD-MCNC: 15.3 G/DL (ref 12–17)
IMM GRANULOCYTES # BLD AUTO: 0.02 THOUSAND/UL (ref 0–0.2)
IMM GRANULOCYTES NFR BLD AUTO: 0 % (ref 0–2)
INR PPP: 1.75 (ref 0.84–1.19)
LYMPHOCYTES # BLD AUTO: 1.14 THOUSANDS/ÂΜL (ref 0.6–4.47)
LYMPHOCYTES NFR BLD AUTO: 16 % (ref 14–44)
MCH RBC QN AUTO: 31.3 PG (ref 26.8–34.3)
MCHC RBC AUTO-ENTMCNC: 34.5 G/DL (ref 31.4–37.4)
MCV RBC AUTO: 91 FL (ref 82–98)
MONOCYTES # BLD AUTO: 0.46 THOUSAND/ÂΜL (ref 0.17–1.22)
MONOCYTES NFR BLD AUTO: 6 % (ref 4–12)
NEUTROPHILS # BLD AUTO: 5.65 THOUSANDS/ÂΜL (ref 1.85–7.62)
NEUTS SEG NFR BLD AUTO: 77 % (ref 43–75)
NRBC BLD AUTO-RTO: 0 /100 WBCS
PLATELET # BLD AUTO: 173 THOUSANDS/UL (ref 149–390)
PMV BLD AUTO: 10.2 FL (ref 8.9–12.7)
POTASSIUM SERPL-SCNC: 4.1 MMOL/L (ref 3.5–5.3)
PROT SERPL-MCNC: 6.6 G/DL (ref 6.4–8.4)
PROTHROMBIN TIME: 20.6 SECONDS (ref 11.6–14.5)
RBC # BLD AUTO: 4.79 MILLION/UL (ref 3.88–5.62)
SODIUM SERPL-SCNC: 136 MMOL/L (ref 135–147)
WBC # BLD AUTO: 7.36 THOUSAND/UL (ref 4.31–10.16)

## 2023-12-31 PROCEDURE — 93005 ELECTROCARDIOGRAM TRACING: CPT

## 2023-12-31 PROCEDURE — 80053 COMPREHEN METABOLIC PANEL: CPT | Performed by: STUDENT IN AN ORGANIZED HEALTH CARE EDUCATION/TRAINING PROGRAM

## 2023-12-31 PROCEDURE — 84484 ASSAY OF TROPONIN QUANT: CPT | Performed by: STUDENT IN AN ORGANIZED HEALTH CARE EDUCATION/TRAINING PROGRAM

## 2023-12-31 PROCEDURE — 85018 HEMOGLOBIN: CPT | Performed by: INTERNAL MEDICINE

## 2023-12-31 PROCEDURE — 85610 PROTHROMBIN TIME: CPT | Performed by: STUDENT IN AN ORGANIZED HEALTH CARE EDUCATION/TRAINING PROGRAM

## 2023-12-31 PROCEDURE — 99285 EMERGENCY DEPT VISIT HI MDM: CPT

## 2023-12-31 PROCEDURE — 85025 COMPLETE CBC W/AUTO DIFF WBC: CPT | Performed by: STUDENT IN AN ORGANIZED HEALTH CARE EDUCATION/TRAINING PROGRAM

## 2023-12-31 PROCEDURE — 84484 ASSAY OF TROPONIN QUANT: CPT | Performed by: INTERNAL MEDICINE

## 2023-12-31 PROCEDURE — 82272 OCCULT BLD FECES 1-3 TESTS: CPT

## 2023-12-31 PROCEDURE — 85730 THROMBOPLASTIN TIME PARTIAL: CPT | Performed by: STUDENT IN AN ORGANIZED HEALTH CARE EDUCATION/TRAINING PROGRAM

## 2023-12-31 PROCEDURE — 99223 1ST HOSP IP/OBS HIGH 75: CPT | Performed by: INTERNAL MEDICINE

## 2023-12-31 PROCEDURE — 36415 COLL VENOUS BLD VENIPUNCTURE: CPT | Performed by: STUDENT IN AN ORGANIZED HEALTH CARE EDUCATION/TRAINING PROGRAM

## 2023-12-31 PROCEDURE — C9113 INJ PANTOPRAZOLE SODIUM, VIA: HCPCS | Performed by: INTERNAL MEDICINE

## 2023-12-31 PROCEDURE — 99285 EMERGENCY DEPT VISIT HI MDM: CPT | Performed by: STUDENT IN AN ORGANIZED HEALTH CARE EDUCATION/TRAINING PROGRAM

## 2023-12-31 PROCEDURE — 85014 HEMATOCRIT: CPT | Performed by: INTERNAL MEDICINE

## 2023-12-31 RX ORDER — PANTOPRAZOLE SODIUM 40 MG/10ML
40 INJECTION, POWDER, LYOPHILIZED, FOR SOLUTION INTRAVENOUS
Status: DISCONTINUED | OUTPATIENT
Start: 2023-12-31 | End: 2024-01-01

## 2023-12-31 RX ORDER — SODIUM CHLORIDE 9 MG/ML
75 INJECTION, SOLUTION INTRAVENOUS CONTINUOUS
Status: DISCONTINUED | OUTPATIENT
Start: 2024-01-01 | End: 2024-01-01

## 2023-12-31 RX ORDER — ACETAMINOPHEN 325 MG/1
650 TABLET ORAL EVERY 6 HOURS PRN
Status: DISCONTINUED | OUTPATIENT
Start: 2023-12-31 | End: 2024-01-01 | Stop reason: HOSPADM

## 2023-12-31 RX ORDER — LOSARTAN POTASSIUM 50 MG/1
50 TABLET ORAL DAILY
Status: DISCONTINUED | OUTPATIENT
Start: 2024-01-01 | End: 2023-12-31

## 2023-12-31 RX ORDER — HYDROCORTISONE 25 MG/G
CREAM TOPICAL 2 TIMES DAILY
Status: DISCONTINUED | OUTPATIENT
Start: 2023-12-31 | End: 2024-01-01 | Stop reason: HOSPADM

## 2023-12-31 RX ORDER — METOPROLOL TARTRATE 50 MG/1
50 TABLET, FILM COATED ORAL 2 TIMES DAILY
Status: DISCONTINUED | OUTPATIENT
Start: 2023-12-31 | End: 2024-01-01 | Stop reason: HOSPADM

## 2023-12-31 RX ADMIN — PANTOPRAZOLE SODIUM 40 MG: 40 INJECTION, POWDER, FOR SOLUTION INTRAVENOUS at 21:17

## 2023-12-31 RX ADMIN — METOPROLOL TARTRATE 50 MG: 50 TABLET, FILM COATED ORAL at 19:40

## 2023-12-31 NOTE — H&P
[facility]  H&P  Name: Jovi Turpin 53 y.o. male I MRN: [id number]  Unit/Bed#: ED 01 I Date of Admission: 12/31/2023   Date of Service: 12/31/2023 I Hospital Day: 0      Assessment/Plan   * Rectal bleeding  Assessment & Plan  Presented with episode of rectal bleeding, noted to have large amount of bright red blood on the toilet for for, intolerant bowel and splattered around the back of the toilet  Patient reported associated rectal discomfort and burning on the left side of the rectum prior to the event  Patient had history of hemorrhoids with small amount of bleeding on toilet paper in the past but never had significant bleeding like today  History of colonoscopies in the past x 3 most recent less than 5 years ago which was unremarkable per patient was reported to have an internal hemorrhoid.  No known history of diverticulosis  Denies any nausea vomiting abdominal pain.  Reported mild dizziness  Noted to hemoglobin of 15, BUN was mildly elevated.  Rectal exam in the ED revealed guaiac positive positive brown stool.  No obvious hemorrhoids when examined  Monitor H&H  Monitor for bleeding  Hold Xarelto  Type and screen  N.p.o. after midnight  GI evaluation    Atypical chest pain  Assessment & Plan  Patient reported episode of gaseous abdominal and chest discomfort, similar to his prior episodes.  Improved spontaneously  EKG normal sinus rhythm without any ST-T wave changes  Troponin negative  Monitor symptoms  Trial of PPI    Atrial fibrillation (HCC)  Assessment & Plan  History of A-fib status post ablation  EKG with sinus rhythm  Continue metoprolol  Holding Xarelto for now    Benign essential hypertension  Assessment & Plan  Takes metoprolol and half tablet of olmesartan  Reports that he is being tapered off antihypertensive due to improvement in blood pressure after weight loss  Currently blood pressure stable  Continue metoprolol  Hold olmesartan for now  Monitor blood pressure  trend    Esophageal reflux  Assessment & Plan  Reports history of gaseous epigastric and lower chest discomfort often improved by burping and belching  Denies any nausea vomiting hematemesis  Trial of PPI             VTE Pharmacologic Prophylaxis: VTE Score: 2 activity as tolerated  Code Status: Level 1 - Full Code       Anticipated Length of Stay: Patient will be admitted on an observation basis with an anticipated length of stay of less than 2 midnights secondary to GI bleeding.    Total Time Spent on Date of Encounter in care of patient: 35 mins. This time was spent on one or more of the following: performing physical exam; counseling and coordination of care; obtaining or reviewing history; documenting in the medical record; reviewing/ordering tests, medications or procedures; communicating with other healthcare professionals and discussing with patient's family/caregivers.    Chief Complaint: Rectal bleeding    History of Present Illness:  Jovi Turpin is a 53 y.o. male with a PMH of A-fib status post ablation on anticoagulation, hypertension, hemorrhoids who presents with rectal bleeding.  Patient reported that about 30 minutes prior to arrival to ED patient had a bowel movement and subsequently a noted large amount of bright red blood on toilet paper, in the toilet bowl and also splattered around back of the toilet.  Patient reported that he panicked and got very anxious due to significant amount of blood.  Patient reported that he had history of hemorrhoids and small amount of bleeding on the toilet paper in the past but never to that intensity.  Patient did report that he experiences some rectal discomfort and burning on the left side prior to the event.  Patient denies any nausea, vomiting, abdominal pain but endorses to have symptoms of gaseous abdominal pain which usually gets relieved by burping and belching.  He reported that he had similar gaseous pain and chest tightness also today which  subsequently resolved spontaneously.  Patient reported that prior to the event he had bowel movement with semisoft stool with yellowish-brown stool over the last few days.  Patient also reported that he had colonoscopies previously x 3 most recent within less than 5 years which were unremarkable but reported that he had internal hemorrhoid.  He is unaware about or any history of diverticulosis.  Patient denies any having endoscopy in the past.  Patient denies any nausea vomiting hematemesis, melena.  Patient denies any NSAID use.  Patient reports that due to today's episode he became very anxious and concerned especially as he is on anticoagulation.    In ED patient's vital signs were stable saturating adequately on room air.  On exam he was noted to have brown guaiac positive stool.  No hemorrhoids were appreciated.  Hemoglobin was noted to be 15 and patient was subsequently admitted.    Review of Systems:  Review of Systems   Constitutional:  Negative for activity change and fever.   HENT:  Negative for congestion.    Respiratory:  Negative for cough and shortness of breath.    Cardiovascular:  Negative for chest pain, palpitations and leg swelling.   Gastrointestinal:  Positive for anal bleeding and rectal pain. Negative for abdominal pain, diarrhea, nausea and vomiting.        Reports gas pains, relieved by burping and belching   Genitourinary:  Negative for difficulty urinating.   Neurological:  Positive for dizziness. Negative for syncope and headaches.   Psychiatric/Behavioral:  Negative for behavioral problems. The patient is nervous/anxious.        Past Medical and Surgical History:   Past Medical History:   Diagnosis Date    Atrial fibrillation (HCC)     HTN (hypertension)        Past Surgical History:   Procedure Laterality Date    CARDIAC ELECTROPHYSIOLOGY STUDY AND ABLATION      CLAVICLE SURGERY Left        Meds/Allergies:  Prior to Admission medications    Medication Sig Start Date End Date Taking?  Authorizing Provider   metoprolol tartrate (LOPRESSOR) 50 mg tablet TAKE ONE TABLET BY MOUTH TWICE A DAY 10/18/23   Jack Rudd MD   olmesartan (BENICAR) 20 mg tablet Take 1 tablet (20 mg total) by mouth daily 23   Jack Rudd MD   sildenafil (VIAGRA) 100 mg tablet TAKE ONE TABLET BY MOUTH AS NEEDED 23   Jack Rudd MD   Xarelto 20 MG tablet TAKE ONE TABLET BY MOUTH DAILY 23   Jack Rudd MD     I have reviewed home medications with patient personally.    Allergies: No Known Allergies    Social History:  Marital Status: /Civil Union   Patient Pre-hospital Living Situation: Home  Patient Pre-hospital Level of Mobility: walks    Substance Use History:   Social History     Substance and Sexual Activity   Alcohol Use Yes    Alcohol/week: 1.0 standard drink of alcohol    Types: 1 Cans of beer per week    Comment: few weekly     Social History     Tobacco Use   Smoking Status Former    Current packs/day: 0.00    Average packs/day: 0.5 packs/day for 5.0 years (2.5 ttl pk-yrs)    Types: Cigarettes    Start date:     Quit date:     Years since quittin.0   Smokeless Tobacco Never     Social History     Substance and Sexual Activity   Drug Use Never       Family History:  Family History   Problem Relation Age of Onset    Atrial fibrillation Father        Physical Exam:     Vitals:   Blood Pressure: 118/71 (23 1630)  Pulse: 96 (23 1630)  Temperature: 97.9 °F (36.6 °C) (23 1558)  Temp Source: Tympanic (23 1558)  Respirations: 20 (23 1558)  Weight - Scale: 92.5 kg (204 lb) (23 1558)  SpO2: 97 % (23 1630)    Physical Exam  Vitals and nursing note reviewed.   Constitutional:       General: He is not in acute distress.     Appearance: He is well-developed.   HENT:      Head: Normocephalic and atraumatic.   Eyes:      Conjunctiva/sclera: Conjunctivae normal.   Cardiovascular:      Rate and Rhythm: Normal rate and regular rhythm.       Heart sounds: No murmur heard.  Pulmonary:      Effort: Pulmonary effort is normal. No respiratory distress.      Breath sounds: Normal breath sounds.   Abdominal:      Palpations: Abdomen is soft.      Tenderness: There is no abdominal tenderness.   Musculoskeletal:         General: No swelling.      Cervical back: Neck supple.   Skin:     General: Skin is warm and dry.      Capillary Refill: Capillary refill takes less than 2 seconds.   Neurological:      Mental Status: He is alert.   Psychiatric:         Mood and Affect: Mood normal.          Additional Data:     Lab Results:  Results from last 7 days   Lab Units 12/31/23  1628   WBC Thousand/uL 7.36   HEMOGLOBIN g/dL 15.0   HEMATOCRIT % 43.5   PLATELETS Thousands/uL 173   NEUTROS PCT % 77*   LYMPHS PCT % 16   MONOS PCT % 6   EOS PCT % 1     Results from last 7 days   Lab Units 12/31/23  1628   SODIUM mmol/L 136   POTASSIUM mmol/L 4.1   CHLORIDE mmol/L 103   CO2 mmol/L 27   BUN mg/dL 33*   CREATININE mg/dL 1.20   ANION GAP mmol/L 6   CALCIUM mg/dL 8.6   ALBUMIN g/dL 4.1   TOTAL BILIRUBIN mg/dL 0.44   ALK PHOS U/L 41   ALT U/L 16   AST U/L 14   GLUCOSE RANDOM mg/dL 116     Results from last 7 days   Lab Units 12/31/23  1628   INR  1.75*                   Lines/Drains:  Invasive Devices       Peripheral Intravenous Line  Duration             Peripheral IV 12/31/23 Left Antecubital <1 day                        Imaging: No pertinent imaging reviewed.  No orders to display       EKG and Other Studies Reviewed on Admission:   EKG: NSR. HR 96 bpm.  No new ST-T wave changes, QTc 437    ** Please Note: This note has been constructed using a voice recognition system. **

## 2023-12-31 NOTE — ED PROVIDER NOTES
History  Chief Complaint   Patient presents with    Rectal Bleeding     States he takes Xarelto. States  30 minutes ago had a BM and there was a lot of bright red blood in toilet and on toilet paper. Denies abdominal pain. States rectal burning. Told in past that he has hemorrhoids. Color pink, skin warm and dry.      Patient is a 53-year-old male, past medical history including hypertension, and A-fib on Xarelto, who presents to the emergency department for rectal bleeding.  Patient states that about 30 minutes ago he had a bowel movement.  When he went to wipe he noticed bright red blood on the toilet paper.  He then looked in the toilet and noticed blood splattered on the back of the toilet.  Unsure if there was blood mixed in with the stool.  He now presents for further evaluation.  Denies any abdominal pain.  Does endorse some rectal discomfort/burning.  No prior history of symptoms like this in the past.  Did have some chest pain earlier today while walking but none currently.  Denies any shortness of breath.  Has had 3 colonoscopies that were negative.  No other complaints or concerns.        Prior to Admission Medications   Prescriptions Last Dose Informant Patient Reported? Taking?   Xarelto 20 MG tablet  Self No No   Sig: TAKE ONE TABLET BY MOUTH DAILY   metoprolol tartrate (LOPRESSOR) 50 mg tablet  Self No No   Sig: TAKE ONE TABLET BY MOUTH TWICE A DAY   olmesartan (BENICAR) 20 mg tablet  Self No No   Sig: Take 1 tablet (20 mg total) by mouth daily   sildenafil (VIAGRA) 100 mg tablet  Self No No   Sig: TAKE ONE TABLET BY MOUTH AS NEEDED      Facility-Administered Medications: None       Past Medical History:   Diagnosis Date    Atrial fibrillation (HCC)     HTN (hypertension)        Past Surgical History:   Procedure Laterality Date    CARDIAC ELECTROPHYSIOLOGY STUDY AND ABLATION      CLAVICLE SURGERY Left        Family History   Problem Relation Age of Onset    Atrial fibrillation Father      I have  reviewed and agree with the history as documented.    E-Cigarette/Vaping    E-Cigarette Use Never User      E-Cigarette/Vaping Substances    Nicotine No     THC No     CBD No     Flavoring No     Other No     Unknown No      Social History     Tobacco Use    Smoking status: Former     Current packs/day: 0.00     Average packs/day: 0.5 packs/day for 5.0 years (2.5 ttl pk-yrs)     Types: Cigarettes     Start date:      Quit date:      Years since quittin.0    Smokeless tobacco: Never   Vaping Use    Vaping status: Never Used   Substance Use Topics    Alcohol use: Yes     Alcohol/week: 1.0 standard drink of alcohol     Types: 1 Cans of beer per week     Comment: few weekly    Drug use: Never       Review of Systems   Constitutional:  Negative for chills and fever.   Respiratory:  Negative for shortness of breath.    Cardiovascular:  Positive for chest pain.   Gastrointestinal:  Positive for anal bleeding. Negative for abdominal pain.   All other systems reviewed and are negative.      Physical Exam  Physical Exam  Vitals and nursing note reviewed. Exam conducted with a chaperone present.   Constitutional:       General: He is not in acute distress.     Appearance: He is well-developed. He is not diaphoretic.   HENT:      Head: Normocephalic and atraumatic.      Right Ear: External ear normal.      Left Ear: External ear normal.      Nose: Nose normal.   Eyes:      General: Lids are normal. No scleral icterus.  Cardiovascular:      Rate and Rhythm: Normal rate and regular rhythm.      Heart sounds: Normal heart sounds. No murmur heard.     No friction rub. No gallop.   Pulmonary:      Effort: Pulmonary effort is normal. No respiratory distress.      Breath sounds: Normal breath sounds. No wheezing or rales.   Abdominal:      Palpations: Abdomen is soft.      Tenderness: There is no abdominal tenderness. There is no guarding or rebound.   Genitourinary:     Rectum: Guaiac result positive.      Comments:  Brown stool, guaiac positive no external hemorrhoids.  No palpable internal hemorrhoids.  Musculoskeletal:         General: No deformity. Normal range of motion.      Cervical back: Normal range of motion and neck supple.   Skin:     General: Skin is warm and dry.   Neurological:      General: No focal deficit present.      Mental Status: He is alert.   Psychiatric:         Mood and Affect: Mood normal.         Behavior: Behavior normal.         Vital Signs  ED Triage Vitals [12/31/23 1558]   Temperature Pulse Respirations Blood Pressure SpO2   97.9 °F (36.6 °C) 98 20 127/72 98 %      Temp Source Heart Rate Source Patient Position - Orthostatic VS BP Location FiO2 (%)   Tympanic Monitor Sitting Left arm --      Pain Score       3           Vitals:    12/31/23 1558 12/31/23 1630   BP: 127/72 118/71   Pulse: 98 96   Patient Position - Orthostatic VS: Sitting          Visual Acuity      ED Medications  Medications - No data to display    Diagnostic Studies  Results Reviewed       Procedure Component Value Units Date/Time    APTT [[id number]]  (Normal) Collected: 12/31/23 1628    Lab Status: Final result Specimen: Blood from Arm, Left Updated: 12/31/23 1707     PTT 37 seconds     Protime-INR [[id number]]  (Abnormal) Collected: 12/31/23 1628    Lab Status: Final result Specimen: Blood from Arm, Left Updated: 12/31/23 1707     Protime 20.6 seconds      INR 1.75    HS Troponin 0hr (reflex protocol) [[id number]]  (Normal) Collected: 12/31/23 1628    Lab Status: Final result Specimen: Blood from Arm, Left Updated: 12/31/23 1705     hs TnI 0hr <2 ng/L     HS Troponin I 2hr [[id number]]     Lab Status: No result Specimen: Blood     Comprehensive metabolic panel [[id number]]  (Abnormal) Collected: 12/31/23 1628    Lab Status: Final result Specimen: Blood from Arm, Left Updated: 12/31/23 1657     Sodium 136 mmol/L      Potassium 4.1 mmol/L      Chloride 103 mmol/L      CO2 27 mmol/L      ANION GAP 6 mmol/L      BUN 33 mg/dL       Creatinine 1.20 mg/dL      Glucose 116 mg/dL      Calcium 8.6 mg/dL      AST 14 U/L      ALT 16 U/L      Alkaline Phosphatase 41 U/L      Total Protein 6.6 g/dL      Albumin 4.1 g/dL      Total Bilirubin 0.44 mg/dL      eGFR 68 ml/min/1.73sq m     Narrative:      National Kid[employer] Foundation guidelines for Chronic Kidney Disease (CKD):     Stage 1 with normal or high GFR (GFR > 90 mL/min/1.73 square meters)    Stage 2 Mild CKD (GFR = 60-89 mL/min/1.73 square meters)    Stage 3A Moderate CKD (GFR = 45-59 mL/min/1.73 square meters)    Stage 3B Moderate CKD (GFR = 30-44 mL/min/1.73 square meters)    Stage 4 Severe CKD (GFR = 15-29 mL/min/1.73 square meters)    Stage 5 End Stage CKD (GFR <15 mL/min/1.73 square meters)  Note: GFR calculation is accurate only with a steady state creatinine    CBC and differential [[id number]]  (Abnormal) Collected: 12/31/23 1628    Lab Status: Final result Specimen: Blood from Arm, Left Updated: 12/31/23 1634     WBC 7.36 Thousand/uL      RBC 4.79 Million/uL      Hemoglobin 15.0 g/dL      Hematocrit 43.5 %      MCV 91 fL      MCH 31.3 pg      MCHC 34.5 g/dL      RDW 13.3 %      MPV 10.2 fL      Platelets 173 Thousands/uL      nRBC 0 /100 WBCs      Neutrophils Relative 77 %      Immat GRANS % 0 %      Lymphocytes Relative 16 %      Monocytes Relative 6 %      Eosinophils Relative 1 %      Basophils Relative 0 %      Neutrophils Absolute 5.65 Thousands/µL      Immature Grans Absolute 0.02 Thousand/uL      Lymphocytes Absolute 1.14 Thousands/µL      Monocytes Absolute 0.46 Thousand/µL      Eosinophils Absolute 0.06 Thousand/µL      Basophils Absolute 0.03 Thousands/µL                    No orders to display              Procedures  ECG 12 Lead Documentation Only    Date/Time: 12/31/2023 4:39 PM    Performed by: Miguel Smith DO  Authorized by: Miguel Smith DO    ECG reviewed by me, the ED Provider: yes    Patient location:  ED  Interpretation:     Interpretation: normal     Rate:     ECG rate:  96    ECG rate assessment: normal    Rhythm:     Rhythm: sinus rhythm    Ectopy:     Ectopy: none    QRS:     QRS axis:  Normal  Conduction:     Conduction: normal    ST segments:     ST segments:  Normal  T waves:     T waves: normal             ED Course  ED Course as of 12/31/23 1733   Sun Dec 31, 2023   1634 Hemoglobin: 15.0  Alma Score 11. Discharge not recommended.   1659 BUN(!): 33   1718 Discussed with SLIM. Accepts admission             HEART Risk Score      Flowsheet Row Most Recent Value   Heart Score Risk Calculator    History 1 Filed at: 12/31/2023 1732   ECG 0 Filed at: 12/31/2023 1732   Age 1 Filed at: 12/31/2023 1732   Risk Factors 1 Filed at: 12/31/2023 1732   Troponin 0 Filed at: 12/31/2023 1732   HEART Score 3 Filed at: 12/31/2023 1732                          SBIRT 20yo+      Flowsheet Row Most Recent Value   Initial Alcohol Screen: US AUDIT-C     1. How often do you have a drink containing alcohol? 5 Filed at: 12/31/2023 1600   2. How many drinks containing alcohol do you have on a typical day you are drinking?  1 Filed at: 12/31/2023 1600   3a. Male UNDER 65: How often do you have five or more drinks on one occasion? 0 Filed at: 12/31/2023 1600   Audit-C Score 6 Filed at: 12/31/2023 1600   [place]: How many times in the past year have you...    Used an illegal drug or used a prescription medication for non-medical reasons? Never Filed at: 12/31/2023 1600                      Medical Decision Making  Patient is a 53 y.o. male who presents to the ED for rectal bleeding.  Patient is nontoxic and well-appearing.  Vitals are stable.  On exam he is guaiac positive brown stool.  Otherwise, physical exam is unremarkable.    Symptoms most likely due to internal hemorrhoid. Differential diagnosis includes AVM, diverticular bleed. Low suspicion for rectal ulcer (HIV, syphilis, STI) or rectal foreign body. Presentation not consistent with other acute, emergent causes of upper or  "lower GI bleeding. No evidence of hemorrhagic shock.    Chest pain differential includes angina, msk pain. Possible 2/2 anemia given bleeding?    Plan to check labs to evaluate the extent of bleeding, including H/H. No indication for abdominal imaging at this time.    Plan: Labs, serial reassessment                 Portions of the record may have been created with voice recognition software. Occasional wrong word or \"sound a like\" substitutions may have occurred due to the inherent limitations of voice recognition software. Read the chart carefully and recognize, using context, where substitutions have occurred.    Amount and/or Complexity of Data Reviewed  Labs: ordered. Decision-making details documented in ED Course.    Risk  Decision regarding hospitalization.             Disposition  Final diagnoses:   GI bleed     Time reflects when diagnosis was documented in both MDM as applicable and the Disposition within this note       Time User Action Codes Description Comment    12/31/2023  5:22 PM Miguel Smith Add [K92.2] GI bleed           ED Disposition       ED Disposition   Admit    Condition   Stable    Date/Time   Sun Dec 31, 2023 1733    Comment   Case was discussed with Dr Gilman and the patient's admission status was agreed to be Admission Status: observation status to the service of Dr. Gilman  .               Follow-up Information    None         Patient's Medications   Discharge Prescriptions    No medications on file       No discharge procedures on file.    PDMP Review       None            ED Provider  Electronically Signed by             Miguel Smith DO  12/31/23 1737    "

## 2024-01-01 VITALS
BODY MASS INDEX: 28.04 KG/M2 | RESPIRATION RATE: 16 BRPM | WEIGHT: 200.3 LBS | TEMPERATURE: 98.9 F | SYSTOLIC BLOOD PRESSURE: 112 MMHG | DIASTOLIC BLOOD PRESSURE: 79 MMHG | HEIGHT: 71 IN | OXYGEN SATURATION: 98 % | HEART RATE: 85 BPM

## 2024-01-01 PROBLEM — R07.89 ATYPICAL CHEST PAIN: Status: RESOLVED | Noted: 2023-12-31 | Resolved: 2024-01-01

## 2024-01-01 PROBLEM — K62.5 RECTAL BLEEDING: Status: RESOLVED | Noted: 2023-12-31 | Resolved: 2024-01-01

## 2024-01-01 LAB
ABO GROUP BLD: NORMAL
ABO GROUP BLD: NORMAL
ANION GAP SERPL CALCULATED.3IONS-SCNC: 4 MMOL/L
BLD GP AB SCN SERPL QL: NEGATIVE
BUN SERPL-MCNC: 20 MG/DL (ref 5–25)
CALCIUM SERPL-MCNC: 8.5 MG/DL (ref 8.4–10.2)
CHLORIDE SERPL-SCNC: 107 MMOL/L (ref 96–108)
CO2 SERPL-SCNC: 26 MMOL/L (ref 21–32)
CREAT SERPL-MCNC: 0.97 MG/DL (ref 0.6–1.3)
ERYTHROCYTE [DISTWIDTH] IN BLOOD BY AUTOMATED COUNT: 13.5 % (ref 11.6–15.1)
GFR SERPL CREATININE-BSD FRML MDRD: 88 ML/MIN/1.73SQ M
GLUCOSE SERPL-MCNC: 105 MG/DL (ref 65–140)
HCT VFR BLD AUTO: 43.5 % (ref 36.5–49.3)
HGB BLD-MCNC: 14.7 G/DL (ref 12–17)
MCH RBC QN AUTO: 30.4 PG (ref 26.8–34.3)
MCHC RBC AUTO-ENTMCNC: 33.8 G/DL (ref 31.4–37.4)
MCV RBC AUTO: 90 FL (ref 82–98)
PLATELET # BLD AUTO: 156 THOUSANDS/UL (ref 149–390)
PMV BLD AUTO: 10.1 FL (ref 8.9–12.7)
POTASSIUM SERPL-SCNC: 4.1 MMOL/L (ref 3.5–5.3)
RBC # BLD AUTO: 4.83 MILLION/UL (ref 3.88–5.62)
RH BLD: POSITIVE
RH BLD: POSITIVE
SODIUM SERPL-SCNC: 137 MMOL/L (ref 135–147)
SPECIMEN EXPIRATION DATE: NORMAL
WBC # BLD AUTO: 5.53 THOUSAND/UL (ref 4.31–10.16)

## 2024-01-01 PROCEDURE — C9113 INJ PANTOPRAZOLE SODIUM, VIA: HCPCS | Performed by: INTERNAL MEDICINE

## 2024-01-01 PROCEDURE — 86900 BLOOD TYPING SEROLOGIC ABO: CPT | Performed by: INTERNAL MEDICINE

## 2024-01-01 PROCEDURE — 99239 HOSP IP/OBS DSCHRG MGMT >30: CPT | Performed by: INTERNAL MEDICINE

## 2024-01-01 PROCEDURE — 86901 BLOOD TYPING SEROLOGIC RH(D): CPT | Performed by: INTERNAL MEDICINE

## 2024-01-01 PROCEDURE — 99214 OFFICE O/P EST MOD 30 MIN: CPT | Performed by: STUDENT IN AN ORGANIZED HEALTH CARE EDUCATION/TRAINING PROGRAM

## 2024-01-01 PROCEDURE — 80048 BASIC METABOLIC PNL TOTAL CA: CPT | Performed by: INTERNAL MEDICINE

## 2024-01-01 PROCEDURE — 85027 COMPLETE CBC AUTOMATED: CPT | Performed by: INTERNAL MEDICINE

## 2024-01-01 PROCEDURE — 86850 RBC ANTIBODY SCREEN: CPT | Performed by: INTERNAL MEDICINE

## 2024-01-01 RX ORDER — HYDROCORTISONE 25 MG/G
CREAM TOPICAL 2 TIMES DAILY
Qty: 28 G | Refills: 0 | Status: SHIPPED | OUTPATIENT
Start: 2024-01-01

## 2024-01-01 RX ADMIN — HYDROCORTISONE: 25 CREAM TOPICAL at 09:03

## 2024-01-01 RX ADMIN — PANTOPRAZOLE SODIUM 40 MG: 40 INJECTION, POWDER, FOR SOLUTION INTRAVENOUS at 09:03

## 2024-01-01 RX ADMIN — METOPROLOL TARTRATE 50 MG: 50 TABLET, FILM COATED ORAL at 09:03

## 2024-01-01 RX ADMIN — SODIUM CHLORIDE 75 ML/HR: 0.9 INJECTION, SOLUTION INTRAVENOUS at 00:22

## 2024-01-01 NOTE — ASSESSMENT & PLAN NOTE
Takes metoprolol and half tablet of olmesartan  Reports that he is being tapered off antihypertensive due to improvement in blood pressure after weight loss  Currently blood pressure stable  Continue metoprolol  Hold olmesartan for now  Monitor blood pressure trend

## 2024-01-01 NOTE — ASSESSMENT & PLAN NOTE
Patient reported episode of gaseous abdominal and chest discomfort, similar to his prior episodes.  Improved spontaneously  EKG normal sinus rhythm without any ST-T wave changes  Troponin negative  Monitor symptoms  GI follow-up after discharge

## 2024-01-01 NOTE — ASSESSMENT & PLAN NOTE
Reports history of gaseous epigastric and lower chest discomfort often improved by burping and belching  Denies any nausea vomiting hematemesis  Tolerating diet  Follow-up with GI as outpatient

## 2024-01-01 NOTE — CONSULTS
Consultation -  [facility]  Jovi Turpin 53 y.o. male MRN: [id number]  Unit/Bed#: [address] [place] [address] Encounter: [id number]        ASSESSMENT and PLAN:  53-year-old male with history of HTN and Afib s/p ablation on Xarelto presents for initial evaluation for hematochezia.    He presents with one episode of hematochezia with left-sided rectal discomfort. He has a known history of internal and external hemorrhoids that were seen on his last colonoscopy in September 2020 but has no known history of diverticulosis. He has not had any further episodes of bleeding while admitted.     He is otherwise hemodynamically stable with baseline hemoglobin 14-15. Suspect LGIB due to hemorrhoidal vs. diverticular bleeding, but other etiologies include AVMs and less likely malignancy. Would recommend outpatient follow up with consideration of repeat colonoscopy. However, we did discuss potentially scheduling an outpatient colonoscopy prior to GI follow up if he continues to have ongoing bleeding. Please contact us with any questions.    - Advance diet as tolerated  - Restart xarelto and monitor for signs of bleeding  - Continue to trend CBC every 12 hours and transfuse for goal > 7  - Maintain active T&S and two large bore PIV    Yesy Esquivel MD    Reason for Consult: rectal bleeding     HPI: 53-year-old male with history of HTN and Afib s/p ablation on Xarelto presents for initial evaluation for rectal bleeding.    He presented afterward having a large bloody bowel movement with reports of left-sided rectal discomfort.  He does reports increased frequency of stools over the last 1-2 days. He reports prior history of rectal bleeding, typically only seen with wiping. His last colonoscopy was in September 2020 which showed two polyps and internal/external hemorrhoids.  Rectal exam showed brown stool.    On arrival, he was afebrile and hemodynamically stable.  Initial labs were notable for hemoglobin at baseline 14-15 with  "normal BUN.     REVIEW OF SYSTEMS:  10 point ROS otherwise negative unless mentioned above.    PAST MEDICAL/SURGICAL HISTORY:  Past Medical History:   Diagnosis Date    Atrial fibrillation (HCC)     HTN (hypertension)         Past Surgical History:   Procedure Laterality Date    CARDIAC ELECTROPHYSIOLOGY STUDY AND ABLATION      CLAVICLE SURGERY Left        FAMILY/SOCIAL HISTORY:  Family History   Problem Relation Age of Onset    Atrial fibrillation Father        Social History     Tobacco Use    Smoking status: Former     Current packs/day: 0.00     Average packs/day: 0.5 packs/day for 5.0 years (2.5 ttl pk-yrs)     Types: Cigarettes     Start date:      Quit date:      Years since quittin.0    Smokeless tobacco: Never   Vaping Use    Vaping status: Never Used   Substance Use Topics    Alcohol use: Yes     Alcohol/week: 1.0 standard drink of alcohol     Types: 1 Cans of beer per week     Comment: few weekly    Drug use: Never       Meds/Allergies     INPATIENT MEDICATIONS:   Medications Prior to Admission   Medication    metoprolol tartrate (LOPRESSOR) 50 mg tablet    olmesartan (BENICAR) 20 mg tablet    sildenafil (VIAGRA) 100 mg tablet    Xarelto 20 MG tablet       Objective   /79   Pulse 85   Temp 98.9 °F (37.2 °C)   Resp 16   Ht 5' 11\" (1.803 m)   Wt 90.9 kg (200 lb 4.8 oz)   SpO2 98%   BMI 27.94 kg/m²     PHYSICAL EXAM:    General: No acute distress  HEENT: No scleral icterus, normocephalic  Abdomen: Soft, non-tender, non-distended, normoactive bowel sounds, no masses, no hepatomegaly  Extremities: No lower extremity edema  Skin: No jaundice  Neuro: Awake, alert, oriented x 3    Lab Results:   Lab Results   Component Value Date    WBC 5.53 2024    HGB 14.7 2024    HCT 43.5 2024    MCV 90 2024     2024     Lab Results   Component Value Date     2016    SODIUM 137 2024    K 4.1 2024     2024    CO2 26 2024    " "AGAP 4 01/01/2024    BUN 20 01/01/2024    CREATININE 0.97 01/01/2024    GLUC 105 01/01/2024    CALCIUM 8.5 01/01/2024    AST 14 12/31/2023    ALT 16 12/31/2023    ALKPHOS 41 12/31/2023    PROT 7.0 03/18/2016    TP 6.6 12/31/2023    BILITOT 0.6 03/18/2016    TBILI 0.44 12/31/2023    EGFR 88 01/01/2024     Lab Results   Component Value Date    INR 1.75 (H) 12/31/2023    PROTIME 20.6 (H) 12/31/2023     No results found for: \"IRON\", \"TIBC\", \"FERRITIN\"    Imaging Studies: I have personally reviewed pertinent imaging studies.    No results found.      "

## 2024-01-01 NOTE — PLAN OF CARE
Problem: DISCHARGE PLANNING  Goal: Discharge to home or other facility with appropriate resources  Description: INTERVENTIONS:  - Identify barriers to discharge w/patient and caregiver  - Arrange for needed discharge resources and transportation as appropriate  - Identify discharge learning needs (meds, wound care, etc.)  - Arrange for interpretive services to assist at discharge as needed  - Refer to Case Management Department for coordinating discharge planning if the patient needs post-hospital services based on physician/advanced practitioner order or complex needs related to functional status, cognitive ability, or social support system  Outcome: Progressing     Problem: Knowledge Deficit  Goal: Patient/family/caregiver demonstrates understanding of disease process, treatment plan, medications, and discharge instructions  Description: Complete learning assessment and assess knowledge base.  Interventions:  - Provide teaching at level of understanding  - Provide teaching via preferred learning methods  Outcome: Progressing     Problem: CARDIOVASCULAR - ADULT  Goal: Maintains optimal cardiac output and hemodynamic stability  Description: INTERVENTIONS:  - Monitor I/O, vital signs and rhythm  - Monitor for S/S and trends of decreased cardiac output  - Administer and titrate ordered vasoactive medications to optimize hemodynamic stability  - Assess quality of pulses, skin color and temperature  - Assess for signs of decreased coronary artery perfusion  - Instruct patient to report change in severity of symptoms  Outcome: Progressing  Goal: Absence of cardiac dysrhythmias or at baseline rhythm  Description: INTERVENTIONS:  - Continuous cardiac monitoring, vital signs, obtain 12 lead EKG if ordered  - Administer antiarrhythmic and heart rate control medications as ordered  - Monitor electrolytes and administer replacement therapy as ordered  Outcome: Progressing     Problem: HEMATOLOGIC - ADULT  Goal: Maintains  hematologic stability  Description: INTERVENTIONS  - Assess for signs and symptoms of bleeding or hemorrhage  - Monitor labs  - Administer supportive blood products/factors as ordered and appropriate  Outcome: Progressing

## 2024-01-01 NOTE — UTILIZATION REVIEW
Initial Clinical Review    Admission: Date/Time/Statement:   Admission Orders (From admission, onward)       Ordered        12/31/23 1722  Place in Observation  Once                          Orders Placed This Encounter   Procedures    Place in Observation     Standing Status:   Standing     Number of Occurrences:   1     Order Specific Question:   Level of Care     Answer:   Med Surg [16]     ED Arrival Information       Expected   -    Arrival   12/31/2023 15:45    Acuity   Urgent              Means of arrival   Walk-In    Escorted by   Family Member    Service   Hospitalist    Admission type   Emergency              Arrival complaint   RECTAL BLEED/BLOOD THINNERS             Chief Complaint   Patient presents with    Rectal Bleeding     States he takes Xarelto. States  30 minutes ago had a BM and there was a lot of bright red blood in toilet and on toilet paper. Denies abdominal pain. States rectal burning. Told in past that he has hemorrhoids. Color pink, skin warm and dry.        Initial Presentation: 53 y.o. male presented to ED as inpatient admission for rectal bleeding. Past medical history including hypertension, and A-fib on XareltoHas had 3 colonoscopies that were negative , After a bowel movement patient noticed bright red blood on the toilet paper.  He then looked in the toilet and noticed blood splattered on the back of the toilet.  Unsure if there was blood mixed in with the stool. Has had 3 colonoscopies that were negative On exam Brown stool, guaiac positive no external hemorrhoids.  No palpable internal hemorrhoids.  NPO hold Xarelto, GI evaluation, monitor for active bleeding and supportive care     Date:   01-01-24  ASSESSMENT and PLAN:  53-year-old male with history of HTN and Afib s/p ablation on Xarelto presents for initial evaluation for hematochezia.     He presents with one episode of hematochezia with left-sided rectal discomfort. He has a known history of internal and external hemorrhoids  that were seen on his last colonoscopy in September 2020 but has no known history of diverticulosis. He has not had any further episodes of bleeding while admitted.      He is otherwise hemodynamically stable with baseline hemoglobin 14-15. Suspect LGIB due to hemorrhoidal vs. diverticular bleeding, but other etiologies include AVMs and less likely malignancy. Would recommend outpatient follow up with consideration of repeat colonoscopy. However, we did discuss potentially scheduling an outpatient colonoscopy prior to GI follow up if he continues to have ongoing bleeding. Please contact us with any questions.     - Advance diet as tolerated  - Restart xarelto and monitor for signs of bleeding  - Continue to trend CBC every 12 hours and transfuse for goal > 7  - Maintain active T&S and two large bore PIV      ED Triage Vitals [12/31/23 1558]   Temperature Pulse Respirations Blood Pressure SpO2   97.9 °F (36.6 °C) 98 20 127/72 98 %      Temp Source Heart Rate Source Patient Position - Orthostatic VS BP Location FiO2 (%)   Tympanic Monitor Sitting Left arm --      Pain Score       3          Wt Readings from Last 1 Encounters:   12/31/23 90.9 kg (200 lb 4.8 oz)     Additional Vital Signs:   ate/Time Temp Pulse Resp BP MAP (mmHg) SpO2 O2 Device Patient Position - Orthostatic VS   01/01/24 08:08:27 98.9 °F (37.2 °C) 85 -- 112/79 90 98 % -- --   01/01/24 0730 -- -- -- -- -- -- None (Room air) --   12/31/23 22:37:51 98.9 °F (37.2 °C) 71 16 112/79 90 98 % -- --   12/31/23 19:40:02 -- 97 -- 133/85 101 96 % None (Room air) --   12/31/23 1940 -- 97 -- 133/85 -- -- -- --   12/31/23 18:22:30 98.9 °F (37.2 °C) 94 18 135/89 104 96 % None (Room air) Lying   12/31/23 1800 98.7 °F (37.1 °C) 89 18 120/72 -- 97 % None (Room air) Lying   12/31/23 1630 -- 96 -- 118/71 89 97 % -- --     Pertinent Labs/Diagnostic Test Results:     EKG 12-31-23   ECG rate:  96    ECG rate assessment: normal    Rhythm:     Rhythm: sinus rhythm    Ectopy:      Ectopy: none    QRS:     QRS axis:  Normal  Conduction:     Conduction: normal    ST segments:     ST segments:  Normal  T waves:     T waves: normal           Results from last 7 days   Lab Units 01/01/24  0615 12/31/23  2254 12/31/23  1628   WBC Thousand/uL 5.53  --  7.36   HEMOGLOBIN g/dL 14.7 15.3 15.0   HEMATOCRIT % 43.5 45.2 43.5   PLATELETS Thousands/uL 156  --  173   NEUTROS ABS Thousands/µL  --   --  5.65         Results from last 7 days   Lab Units 01/01/24  0615 12/31/23  1628   SODIUM mmol/L 137 136   POTASSIUM mmol/L 4.1 4.1   CHLORIDE mmol/L 107 103   CO2 mmol/L 26 27   ANION GAP mmol/L 4 6   BUN mg/dL 20 33*   CREATININE mg/dL 0.97 1.20   EGFR ml/min/1.73sq m 88 68   CALCIUM mg/dL 8.5 8.6     Results from last 7 days   Lab Units 12/31/23  1628   AST U/L 14   ALT U/L 16   ALK PHOS U/L 41   TOTAL PROTEIN g/dL 6.6   ALBUMIN g/dL 4.1   TOTAL BILIRUBIN mg/dL 0.44         Results from last 7 days   Lab Units 01/01/24  0615 12/31/23  1628   GLUCOSE RANDOM mg/dL 105 116       Results from last 7 days   Lab Units 12/31/23  2135 12/31/23 1952 12/31/23  1628   HS TNI 0HR ng/L  --   --  <2   HS TNI 2HR ng/L  --  2  --    HSTNI D2 ng/L  --  >0  --    HS TNI 4HR ng/L <2  --   --          Results from last 7 days   Lab Units 12/31/23  1628   PROTIME seconds 20.6*   INR  1.75*   PTT seconds 37       Past Medical History:   Diagnosis Date    Atrial fibrillation (HCC)     HTN (hypertension)      Present on Admission:   Atrial fibrillation (HCC)   Benign essential hypertension   Esophageal reflux   Rectal bleeding   Atypical chest pain      Admitting Diagnosis: Rectal bleeding [K62.5]  GI bleed [K92.2]  Age/Sex: 53 y.o. male  Admission Orders:  Scheduled Medications:  hydrocortisone, , Topical, BID  metoprolol tartrate, 50 mg, Oral, BID  pantoprazole, 40 mg, Intravenous, Q24H TINO      Continuous IV Infusions:     PRN Meds:  acetaminophen, 650 mg, Oral, Q6H PRN        IP CONSULT TO GASTROENTEROLOGY    Network  Utilization Review Department  ATTENTION: Please call with any questions or concerns to [phone] and carefully listen to the prompts so that you are directed to the right person. All voicemails are confidential.   For Discharge needs, contact Care Management DC Support Team at [phone] opt. [phone]  Send all requests for admission clinical reviews, approved or denied determinations and any other requests to dedicated fax number below belonging to the campus where the patient is receiving treatment. List of dedicated fax numbers for the Facilities:  FACILITY NAME UR FAX NUMBER   ADMISSION DENIALS (Administrative/Medical Necessity) [phone]   DISCHARGE SUPPORT TEAM (NETWORK) [phone]   PARENT CHILD HEALTH (Maternity/NICU/Pediatrics) [phone]   [facility] [phone]   [facility] [phone]   [facility] [phone]   [facility] [phone]   [facility] [phone]   [facility] [phone]   [facility] [phone]   [facility] [phone]   [facility] [phone]   [facility] [phone]   [facility] [phone]

## 2024-01-01 NOTE — ASSESSMENT & PLAN NOTE
History of A-fib status post ablation  EKG with sinus rhythm  Continue metoprolol  Resume Xarelto on discharge  Continue to follow-up with cardiology

## 2024-01-01 NOTE — ASSESSMENT & PLAN NOTE
Presented with episode of rectal bleeding, noted to have large amount of bright red blood on the toilet for for, intolerant bowel and splattered around the back of the toilet  Patient reported associated rectal discomfort and burning on the left side of the rectum prior to the event  Patient had history of hemorrhoids with small amount of bleeding on toilet paper in the past but never had significant bleeding like today  History of colonoscopies in the past x 3 most recent less than 5 years ago which was unremarkable per patient was reported to have an internal hemorrhoid.  No known history of diverticulosis  Denies any nausea vomiting abdominal pain.  Reported mild dizziness  Noted to hemoglobin of 15, BUN was mildly elevated.  Rectal exam in the ED revealed guaiac positive positive brown stool.  No obvious hemorrhoids when examined  Monitor H&H  Monitor for bleeding  Hold Xarelto  Type and screen  N.p.o. after midnight  GI evaluation

## 2024-01-01 NOTE — ASSESSMENT & PLAN NOTE
Patient reported episode of gaseous abdominal and chest discomfort, similar to his prior episodes.  Improved spontaneously  EKG normal sinus rhythm without any ST-T wave changes  Troponin negative  Monitor symptoms  Trial of PPI

## 2024-01-01 NOTE — ASSESSMENT & PLAN NOTE
Reports history of gaseous epigastric and lower chest discomfort often improved by burping and belching  Denies any nausea vomiting hematemesis  Trial of PPI

## 2024-01-01 NOTE — ASSESSMENT & PLAN NOTE
Takes metoprolol and half tablet of olmesartan  Reports that he is being tapered off antihypertensive due to improvement in blood pressure after weight loss  Currently blood pressure stable on metoprolol  Continue metoprolol  Hold olmesartan for now, recommended to resume if systolic blood pressure more than 130 mmHg  Monitor blood pressure trend

## 2024-01-01 NOTE — PLAN OF CARE
Problem: DISCHARGE PLANNING  Goal: Discharge to home or other facility with appropriate resources  Description: INTERVENTIONS:  - Identify barriers to discharge w/patient and caregiver  - Arrange for needed discharge resources and transportation as appropriate  - Identify discharge learning needs (meds, wound care, etc.)  - Arrange for interpretive services to assist at discharge as needed  - Refer to Case Management Department for coordinating discharge planning if the patient needs post-hospital services based on physician/advanced practitioner order or complex needs related to functional status, cognitive ability, or social support system  Outcome: Progressing     Problem: Knowledge Deficit  Goal: Patient/family/caregiver demonstrates understanding of disease process, treatment plan, medications, and discharge instructions  Description: Complete learning assessment and assess knowledge base.  Interventions:  - Provide teaching at level of understanding  - Provide teaching via preferred learning methods  Outcome: Progressing     Problem: CARDIOVASCULAR - ADULT  Goal: Maintains optimal cardiac output and hemodynamic stability  Description: INTERVENTIONS:  - Monitor I/O, vital signs and rhythm  - Monitor for S/S and trends of decreased cardiac output  - Administer and titrate ordered vasoactive medications to optimize hemodynamic stability  - Assess quality of pulses, skin color and temperature  - Assess for signs of decreased coronary artery perfusion  - Instruct patient to report change in severity of symptoms  Outcome: Progressing     Problem: CARDIOVASCULAR - ADULT  Goal: Absence of cardiac dysrhythmias or at baseline rhythm  Description: INTERVENTIONS:  - Continuous cardiac monitoring, vital signs, obtain 12 lead EKG if ordered  - Administer antiarrhythmic and heart rate control medications as ordered  - Monitor electrolytes and administer replacement therapy as ordered  Outcome: Progressing     Problem:  HEMATOLOGIC - ADULT  Goal: Maintains hematologic stability  Description: INTERVENTIONS  - Assess for signs and symptoms of bleeding or hemorrhage  - Monitor labs  - Administer supportive blood products/factors as ordered and appropriate  Outcome: Progressing

## 2024-01-01 NOTE — ASSESSMENT & PLAN NOTE
Presented with episode of rectal bleeding, noted to have large amount of bright red blood on the toilet for for, intolerant bowel and splattered around the back of the toilet  Patient reported associated rectal discomfort and burning on the left side of the rectum prior to the event  Patient had history of hemorrhoids with small amount of bleeding on toilet paper in the past but never had significant bleeding like today  History of colonoscopies in the past x 3 most recent less than 5 years ago which was unremarkable per patient was reported to have an internal hemorrhoid.  No known history of diverticulosis  Denies any nausea vomiting abdominal pain.  Reported mild dizziness  Noted to hemoglobin of 15, BUN was mildly elevated.  Rectal exam in the ED revealed guaiac positive positive brown stool.  No obvious hemorrhoids when examined  Differential diagnosis includes hemorrhoidal diverticular-less likely AVM, malignancy  No further bleeding during hospitalization, hemoglobin remained stable.  Patient was seen by GI, input appreciated, recommended resumption of the diet and anticoagulation with outpatient follow-up  Patient tolerated resume diet, Bowel movement without any evidence of bleeding.   monitor signs symptoms of bleeding  Follow-up with GI after discharge  Discussed regarding monitoring and follow-up

## 2024-01-01 NOTE — ASSESSMENT & PLAN NOTE
History of A-fib status post ablation  EKG with sinus rhythm  Continue metoprolol  Holding Xarelto for now

## 2024-01-01 NOTE — DISCHARGE INSTR - AVS FIRST PAGE
Monitor for bleeding  Follow-up with GI    Continue metoprolol, Xarelto  Holding olmesartan for now, can resume if systolic blood pressure remains more than 130 mmHg

## 2024-01-01 NOTE — DISCHARGE SUMMARY
Discharge Summary - [facility] Internal Medicine    Patient Information: Jovi Turpin 53 y.o. male MRN: [id number]  Unit/Bed#: [address] [place] [address] Encounter: [id number]    Discharging Physician / Practitioner: Shira Willard MD  PCP: Frank Lombardi, DO  Admission Date: 12/31/2023  Discharge Date: 01/01/24    Reason for Admission: Rectal Bleeding (States he takes Xarelto. States  30 minutes ago had a BM and there was a lot of bright red blood in toilet and on toilet paper. Denies abdominal pain. States rectal burning. Told in past that he has hemorrhoids. Color pink, skin warm and dry. )      Discharge Diagnoses:     Principal Problem (Resolved):    Rectal bleeding  Active Problems:    Benign essential hypertension    Atrial fibrillation (HCC)    Esophageal reflux  Resolved Problems:    Atypical chest pain        * Rectal bleeding-resolved as of 1/1/2024  Assessment & Plan  Presented with episode of rectal bleeding, noted to have large amount of bright red blood on the toilet for for, intolerant bowel and splattered around the back of the toilet  Patient reported associated rectal discomfort and burning on the left side of the rectum prior to the event  Patient had history of hemorrhoids with small amount of bleeding on toilet paper in the past but never had significant bleeding like today  History of colonoscopies in the past x 3 most recent less than 5 years ago which was unremarkable per patient was reported to have an internal hemorrhoid.  No known history of diverticulosis  Denies any nausea vomiting abdominal pain.  Reported mild dizziness  Noted to hemoglobin of 15, BUN was mildly elevated.  Rectal exam in the ED revealed guaiac positive positive brown stool.  No obvious hemorrhoids when examined  Differential diagnosis includes hemorrhoidal diverticular-less likely AVM, malignancy  No further bleeding during hospitalization, hemoglobin remained stable.  Patient was seen by GI, input appreciated, recommended  resumption of the diet and anticoagulation with outpatient follow-up  Patient tolerated resume diet, Bowel movement without any evidence of bleeding.   monitor signs symptoms of bleeding  Follow-up with GI after discharge  Discussed regarding monitoring and follow-up    Atrial fibrillation (HCC)  Assessment & Plan  History of A-fib status post ablation  EKG with sinus rhythm  Continue metoprolol  Resume Xarelto on discharge  Continue to follow-up with cardiology    Benign essential hypertension  Assessment & Plan  Takes metoprolol and half tablet of olmesartan  Reports that he is being tapered off antihypertensive due to improvement in blood pressure after weight loss  Currently blood pressure stable on metoprolol  Continue metoprolol  Hold olmesartan for now, recommended to resume if systolic blood pressure more than 130 mmHg  Monitor blood pressure trend    Atypical chest pain-resolved as of 1/1/2024  Assessment & Plan  Patient reported episode of gaseous abdominal and chest discomfort, similar to his prior episodes.  Improved spontaneously  EKG normal sinus rhythm without any ST-T wave changes  Troponin negative  Monitor symptoms  GI follow-up after discharge    Esophageal reflux  Assessment & Plan  Reports history of gaseous epigastric and lower chest discomfort often improved by burping and belching  Denies any nausea vomiting hematemesis  Tolerating diet  Follow-up with GI as outpatient          Consultations During Hospital Stay:  IP CONSULT TO GASTROENTEROLOGY    Procedures Performed:     None    Significant Findings:     Refer to hospital course and above listed diagnosis related plan for details    Imaging while in hospital:    No results found.    Incidental Findings:   None    Test Results Pending at Discharge (will require follow up):   As per After Visit Summary     Outpatient Tests Requested:  None    Complications:  Refer to hospital course and above listed diagnosis related plan, if any    Hospital  Course:     Jovi Turpin is a 53 y.o. male patient with history of A-fib status post ablation on anticoagulation, hypertension, hemorrhoids who originally presented to the hospital on 12/31/2023 due to rectal bleeding.  Patient reported that about 30 minutes prior to arrival to ED patient had a bowel movement and subsequently a noted large amount of bright red blood on toilet paper, in the toilet bowl and also splattered around back of the toilet.  Patient reported that he panicked and got very anxious due to significant amount of blood.  Patient reported that he had history of hemorrhoids and small amount of bleeding on the toilet paper in the past but never to that intensity.  Patient did report that he experiences some rectal discomfort and burning on the left side prior to the event.  Patient denies any nausea, vomiting, abdominal pain but endorses to have symptoms of gaseous abdominal pain which usually gets relieved by burping and belching.  He reported that he had similar gaseous pain and chest tightness also today which subsequently resolved spontaneously.  Patient reported that prior to the event he had bowel movement with semisoft stool with yellowish-brown stool over the last few days.  Patient also reported that he had colonoscopies previously x 3 most recent within less than 5 years which were unremarkable but reported that he had internal hemorrhoid.  He is unaware about or any history of diverticulosis.  Patient denies any having endoscopy in the past.  Patient denies any nausea vomiting hematemesis, melena.  Patient denies any NSAID use.  Patient reports that due to today's episode he became very anxious and concerned especially as he is on anticoagulation.     In ED patient's vital signs were stable saturating adequately on room air.  On exam he was noted to have brown guaiac positive stool.  No hemorrhoids were appreciated.  Hemoglobin was noted to be 15 and patient was subsequently admitted.   "Patient was monitored during hospitalization.  No further episodes of bleeding were noted.  Hemoglobin remained stable.  Patient remained hemodynamically stable.  Patient was seen by GI, recommended close monitoring and outpatient follow-up.  Diet was resumed, patient was monitored.  There was no evidence of recurrent bleeding, patient had a bowel movement with brown stool without any evidence of blood.  Patient was educated about the potential diagnoses.  Patient to follow-up with GI after discharge.           Please see above list of diagnoses and related plan for additional information.       Condition at Discharge: stable     Discharge Day Visit / Exam:     Subjective:.  Tolerating diet  Denies any chest pain shortness of breath or abdominal pain  Reported that he had bowel movement with brown stool there was no evidence of bleeding.  Mild discomfort on the left side of the rectum noted    Vitals: Blood Pressure: 112/79 (01/01/24 0808)  Pulse: 85 (01/01/24 0808)  Temperature: 98.9 °F (37.2 °C) (01/01/24 0808)  Temp Source: Oral (12/31/23 1822)  Respirations: 16 (12/31/23 2237)  Height: 5' 11\" (180.3 cm) (12/31/23 1822)  Weight - Scale: 90.9 kg (200 lb 4.8 oz) (12/31/23 1822)  SpO2: 98 % (01/01/24 0808)  Exam:   Physical Exam  Constitutional:       General: He is not in acute distress.  HENT:      Head: Normocephalic and atraumatic.   Eyes:      Pupils: Pupils are equal, round, and reactive to light.   Cardiovascular:      Rate and Rhythm: Normal rate.   Pulmonary:      Effort: Pulmonary effort is normal. No respiratory distress.      Breath sounds: No wheezing, rhonchi or rales.   Chest:      Chest wall: No tenderness.   Abdominal:      General: Bowel sounds are normal. There is no distension.      Palpations: Abdomen is soft.      Tenderness: There is no abdominal tenderness. There is no guarding or rebound.   Musculoskeletal:      Right lower leg: No edema.      Left lower leg: No edema.   Skin:     General: " "Skin is warm and dry.      Findings: No rash.   Neurological:      General: No focal deficit present.      Mental Status: He is alert and oriented to person, place, and time. Mental status is at baseline.      Cranial Nerves: No cranial nerve deficit.         Discharge instructions/Information to patient and family:(Discharge Medications and Follow up):   See after visit summary for information provided to patient and family.      Provisions for Follow-Up Care:  See after visit summary for information related to follow-up care and any pertinent home health orders.      Disposition: Home    Planned Readmission:  No     Discharge Statement:  I spent 45 minutes discharging the patient. This time was spent on the day of discharge. I had direct contact with the patient on the day of discharge. Greater than 50% of the total time was spent examining patient, answering all patient questions, arranging and discussing plan of care with patient as well as directly providing post-discharge instructions.  Additional time then spent on discharge activities.  Coordinated with Dr. Sierra TRAN regarding discharge and follow-up recommendations.    Discharge Medications:  See after visit summary for reconciled discharge medications provided to patient and family.      ** Please Note: \"This note has been constructed using a voice recognition system.Therefore there may be syntax, spelling, and/or grammatical errors. Please call if you have any questions. \"**        "

## 2024-01-02 ENCOUNTER — OFFICE VISIT (OUTPATIENT)
Dept: GASTROENTEROLOGY | Facility: CLINIC | Age: 54
End: 2024-01-02
Payer: COMMERCIAL

## 2024-01-02 VITALS
WEIGHT: 200 LBS | SYSTOLIC BLOOD PRESSURE: 120 MMHG | BODY MASS INDEX: 28 KG/M2 | DIASTOLIC BLOOD PRESSURE: 86 MMHG | HEIGHT: 71 IN | HEART RATE: 83 BPM | OXYGEN SATURATION: 99 %

## 2024-01-02 DIAGNOSIS — R07.89 ATYPICAL CHEST PAIN: ICD-10-CM

## 2024-01-02 DIAGNOSIS — K62.5 BRIGHT RED BLOOD PER RECTUM: Primary | ICD-10-CM

## 2024-01-02 DIAGNOSIS — K21.9 GASTROESOPHAGEAL REFLUX DISEASE WITHOUT ESOPHAGITIS: ICD-10-CM

## 2024-01-02 PROBLEM — R13.10 DYSPHAGIA: Status: ACTIVE | Noted: 2024-01-02

## 2024-01-02 LAB
ATRIAL RATE: 95 BPM
ATRIAL RATE: 96 BPM
P AXIS: 108 DEGREES
P AXIS: 69 DEGREES
PR INTERVAL: 156 MS
PR INTERVAL: 156 MS
QRS AXIS: 170 DEGREES
QRS AXIS: 38 DEGREES
QRSD INTERVAL: 84 MS
QRSD INTERVAL: 84 MS
QT INTERVAL: 346 MS
QT INTERVAL: 350 MS
QTC INTERVAL: 437 MS
QTC INTERVAL: 439 MS
T WAVE AXIS: 145 DEGREES
T WAVE AXIS: 58 DEGREES
VENTRICULAR RATE: 95 BPM
VENTRICULAR RATE: 96 BPM

## 2024-01-02 PROCEDURE — 99214 OFFICE O/P EST MOD 30 MIN: CPT | Performed by: INTERNAL MEDICINE

## 2024-01-02 RX ORDER — PANTOPRAZOLE SODIUM 40 MG/1
40 TABLET, DELAYED RELEASE ORAL DAILY
Qty: 30 TABLET | Refills: 6 | Status: SHIPPED | OUTPATIENT
Start: 2024-01-02

## 2024-01-02 NOTE — PROGRESS NOTES
[facility]  Jovi Turpin 53 y.o. male MRN: [id number]            Assessment & Plan:  Pleasant 53-year-old gentleman, history of atrial fibrillation, status post ablation on anticoagulation, presented with bright red blood per rectum to the hospital, normal hemoglobin.  Additionally reports atypical chest and back pain, sometimes exacerbated by eating, and some associated dysphagia type symptoms.    1.  Bright red blood per rectum: Most likely secondary to hemorrhoids  -Given patient's requirement for anticoagulation recommend proceeding with a colonoscopy to exclude luminal pathology  -Continue with hemorrhoid cream as prescribed by hospital    2.  Atypical chest pain and dysphagia: Suspect underlying reflux, EOE, dysphagia, strictures, dysmotility such as esophageal spasm is also on the differential  -Recommend once daily PPI therapy  -Recommend proceeding with an upper endoscopy at the same time as his colonoscopy  -will consult with cardiology to exclude need for prior cardiac workup      Jovi was seen today for rectal bleeding.    Diagnoses and all orders for this visit:    Bright red blood per rectum  -     Colonoscopy; Future    Gastroesophageal reflux disease without esophagitis  -     pantoprazole (PROTONIX) 40 mg tablet; Take 1 tablet (40 mg total) by mouth daily  -     EGD; Future    Atypical chest pain  -     pantoprazole (PROTONIX) 40 mg tablet; Take 1 tablet (40 mg total) by mouth daily    Other orders  -     Diet NPO; Sips with meds; Standing  -     Void on call to OR; Standing            _____________________________________________________________        CC: Hospital follow-up    HPI:  Jovi Turpin is a 53 y.o.male who is here for hospital follow-up visit this is a very pleasant 53-year-old gentleman with a history of atrial fibrillation status post ablation, on anticoagulation, was just in the hospital when he presented with bright red blood per rectum for 2 days, found to have  stable hemoglobin suspected to be due to hemorrhoids.  Last colonoscopy 2020 with internal/external hemorrhoids, 2 small polyps at that time.  Patient reports that since discharge she has had brown stools with no further rectal bleeding.  He is to resume his anticoagulation.  Additionally notes that over the past several months with exertion usually when walking hiking he will have significant pain that starts in his back radiates across his chest as a tightness, improves with rest.  He is also had episodes while drinking water and eating, he has a gas sensation which is relieved by belching.  He also notes some gas and belching at night.  He notes that he drinks soda sometimes that helps to relieve the symptoms.  He denies any typical reflux symptoms but he does have some heartburn at times.  Denies any nausea or vomiting.  Appetite is good, he has intentionally lost about 58 pounds with dietary modification.  Bowel moods have been fairly regular.    Patient has never had an upper endoscopy.    Has had stress tests in the past.    Surgical history as noted above for cardiac ablation.    Denies any tobacco, drinks 3 drinks per week.  Family history his father may have had a colostomy.    Additionally he notes that he recently has had the sensation of food getting stuck he has a slowly swallow foods occasional drink some water to get foods down.      ROS:  The remainder of the ROS was negative except for the pertinent positives mentioned in HPI.      Allergies: Patient has no known allergies.    Medications:   Current Outpatient Medications:     hydrocortisone (ANUSOL-HC) 2.5 % rectal cream, Apply topically 2 (two) times a day, Disp: 28 g, Rfl: 0    metoprolol tartrate (LOPRESSOR) 50 mg tablet, TAKE ONE TABLET BY MOUTH TWICE A DAY, Disp: 180 tablet, Rfl: 0    pantoprazole (PROTONIX) 40 mg tablet, Take 1 tablet (40 mg total) by mouth daily, Disp: 30 tablet, Rfl: 6    sildenafil (VIAGRA) 100 mg tablet, TAKE ONE TABLET  "BY MOUTH AS NEEDED, Disp: 6 tablet, Rfl: 0    Xarelto 20 MG tablet, TAKE ONE TABLET BY MOUTH DAILY, Disp: 90 tablet, Rfl: 1    Past Medical History:   Diagnosis Date    Atrial fibrillation (HCC)     HTN (hypertension)        Past Surgical History:   Procedure Laterality Date    CARDIAC ELECTROPHYSIOLOGY STUDY AND ABLATION      CLAVICLE SURGERY Left     COLONOSCOPY  09/30/2020       Family History   Problem Relation Age of Onset    Atrial fibrillation Father     Colonic polyp Father         reports that he quit smoking about 34 years ago. His smoking use included cigarettes. He started smoking about 39 years ago. He has a 2.5 pack-year smoking history. He has never used smokeless tobacco. He reports current alcohol use of about 1.0 standard drink of alcohol per week. He reports that he does not use drugs.      Physical Exam:    /86 (BP Location: Right arm, Patient Position: Sitting, Cuff Size: Standard)   Pulse 83   Ht 5' 11\" (1.803 m)   Wt 90.7 kg (200 lb)   SpO2 99%   BMI 27.89 kg/m²     Gen: wn/wd, NAD, healthy-appearing gentleman  HEENT: anicteric, MMM, no cervical LAD  CVS: RRR, no m/r/g  CHEST: CTA b/l  ABD: +BS, soft, NT,ND, no hepatosplenomegaly  EXT: no c/c/e  NEURO: aaox3  SKIN: NO rashes    "

## 2024-01-02 NOTE — LETTER
January 2, 2024     Frank Lombardi, DO  [address]  [address]  [facility] [address]    Patient: Jovi Turpin   YOB: 1970   Date of Visit: 1/2/2024       Dear Dr. Lombardi:    Thank you for referring Jovi Turpin to me for evaluation. Below are my notes for this consultation.    If you have questions, please do not hesitate to call me. I look forward to following your patient along with you.         Sincerely,        Harvey Pastrana MD        CC: No Recipients    Harvey Pastrana MD  1/2/2024  4:47 PM  Sign when Signing Visit  [facility]  Jovi Turpin 53 y.o. male MRN: [id number]            Assessment & Plan:  Margaret 53-year-old gentleman, history of atrial fibrillation, status post ablation on anticoagulation, presented with bright red blood per rectum to the hospital, normal hemoglobin.  Additionally reports atypical chest and back pain, sometimes exacerbated by eating, and some associated dysphagia type symptoms.    1.  Bright red blood per rectum: Most likely secondary to hemorrhoids  -Given patient's requirement for anticoagulation recommend proceeding with a colonoscopy to exclude luminal pathology  -Continue with hemorrhoid cream as prescribed by hospital    2.  Atypical chest pain and dysphagia: Suspect underlying reflux, EOE, dysphagia, strictures, dysmotility such as esophageal spasm is also on the differential  -Recommend once daily PPI therapy  -Recommend proceeding with an upper endoscopy at the same time as his colonoscopy  -will consult with cardiology to exclude need for prior cardiac workup      Jovi was seen today for rectal bleeding.    Diagnoses and all orders for this visit:    Bright red blood per rectum  -     Colonoscopy; Future    Gastroesophageal reflux disease without esophagitis  -     pantoprazole (PROTONIX) 40 mg tablet; Take 1 tablet (40 mg total) by mouth daily  -     EGD; Future    Atypical chest pain  -     pantoprazole (PROTONIX) 40 mg tablet;  Take 1 tablet (40 mg total) by mouth daily    Other orders  -     Diet NPO; Sips with meds; Standing  -     Void on call to OR; Standing            _____________________________________________________________        CC: Hospital follow-up    HPI:  Jovi Turpin is a 53 y.o.male who is here for hospital follow-up visit this is a very pleasant 53-year-old gentleman with a history of atrial fibrillation status post ablation, on anticoagulation, was just in the hospital when he presented with bright red blood per rectum for 2 days, found to have stable hemoglobin suspected to be due to hemorrhoids.  Last colonoscopy 2020 with internal/external hemorrhoids, 2 small polyps at that time.  Patient reports that since discharge she has had brown stools with no further rectal bleeding.  He is to resume his anticoagulation.  Additionally notes that over the past several months with exertion usually when walking hiking he will have significant pain that starts in his back radiates across his chest as a tightness, improves with rest.  He is also had episodes while drinking water and eating, he has a gas sensation which is relieved by belching.  He also notes some gas and belching at night.  He notes that he drinks soda sometimes that helps to relieve the symptoms.  He denies any typical reflux symptoms but he does have some heartburn at times.  Denies any nausea or vomiting.  Appetite is good, he has intentionally lost about 58 pounds with dietary modification.  Bowel moods have been fairly regular.    Patient has never had an upper endoscopy.    Has had stress tests in the past.    Surgical history as noted above for cardiac ablation.    Denies any tobacco, drinks 3 drinks per week.  Family history his father may have had a colostomy.    Additionally he notes that he recently has had the sensation of food getting stuck he has a slowly swallow foods occasional drink some water to get foods down.      ROS:  The remainder of the  "ROS was negative except for the pertinent positives mentioned in HPI.      Allergies: Patient has no known allergies.    Medications:   Current Outpatient Medications:   •  hydrocortisone (ANUSOL-HC) 2.5 % rectal cream, Apply topically 2 (two) times a day, Disp: 28 g, Rfl: 0  •  metoprolol tartrate (LOPRESSOR) 50 mg tablet, TAKE ONE TABLET BY MOUTH TWICE A DAY, Disp: 180 tablet, Rfl: 0  •  pantoprazole (PROTONIX) 40 mg tablet, Take 1 tablet (40 mg total) by mouth daily, Disp: 30 tablet, Rfl: 6  •  sildenafil (VIAGRA) 100 mg tablet, TAKE ONE TABLET BY MOUTH AS NEEDED, Disp: 6 tablet, Rfl: 0  •  Xarelto 20 MG tablet, TAKE ONE TABLET BY MOUTH DAILY, Disp: 90 tablet, Rfl: 1    Past Medical History:   Diagnosis Date   • Atrial fibrillation (HCC)    • HTN (hypertension)        Past Surgical History:   Procedure Laterality Date   • CARDIAC ELECTROPHYSIOLOGY STUDY AND ABLATION     • CLAVICLE SURGERY Left    • COLONOSCOPY  09/30/2020       Family History   Problem Relation Age of Onset   • Atrial fibrillation Father    • Colonic polyp Father         reports that he quit smoking about 34 years ago. His smoking use included cigarettes. He started smoking about 39 years ago. He has a 2.5 pack-year smoking history. He has never used smokeless tobacco. He reports current alcohol use of about 1.0 standard drink of alcohol per week. He reports that he does not use drugs.      Physical Exam:    /86 (BP Location: Right arm, Patient Position: Sitting, Cuff Size: Standard)   Pulse 83   Ht 5' 11\" (1.803 m)   Wt 90.7 kg (200 lb)   SpO2 99%   BMI 27.89 kg/m²     Gen: wn/wd, NAD, healthy-appearing gentleman  HEENT: anicteric, MMM, no cervical LAD  CVS: RRR, no m/r/g  CHEST: CTA b/l  ABD: +BS, soft, NT,ND, no hepatosplenomegaly  EXT: no c/c/e  NEURO: aaox3  SKIN: NO rashes    "

## 2024-01-03 ENCOUNTER — TELEPHONE (OUTPATIENT)
Dept: CARDIOLOGY CLINIC | Facility: CLINIC | Age: 54
End: 2024-01-03

## 2024-01-03 ENCOUNTER — TELEPHONE (OUTPATIENT)
Dept: GASTROENTEROLOGY | Facility: CLINIC | Age: 54
End: 2024-01-03

## 2024-01-03 NOTE — TELEPHONE ENCOUNTER
Our mutual patient is scheduled for procedure: colonoscopy and EGD    On: February 22 , 2024     With: Dr. Harvey Pastrana MD    He/She is taking the following blood thinner: Xarelto (Rivaroxaban)    Can this be stopped  2 days prior to the procedure    Physician Approving clearance: Dr. Rudd

## 2024-01-03 NOTE — TELEPHONE ENCOUNTER
Patient reports that he does not have chest pain anything like that. He states that he belches and it improves. He doesn't want to be seen unless you feel he needs to be seen. He reports that this discomfort is in his upper chest, maybe pain when walking. Wife can hear gas in his chest when laying down.

## 2024-01-03 NOTE — TELEPHONE ENCOUNTER
----- Message from Jack Rudd MD sent at 1/2/2024  5:01 PM EST -----      Hi    He should contact our office.  I will ask the office girl to call him to.  We can schedule a stress test for him and he can be seen in our office.      Please schedule him to see me next week.  If he is having active symptoms then he can be seen earlier he should go to the emergency room.  ----- Message -----  From: Harvey Pastrana MD  Sent: 1/2/2024   4:47 PM EST  To: Jack Rudd MD    Good afternoon,  Patient is here for rectal bleeding but also reports having some atypical chest and back pain, worse with exertion but improved with rest, also has some dysphagia.  I suspect this is acid reflux, will place him on PPI therapy but I wanted to inform you in case he needs any kind of stress testing.  We will schedule him for an upper endoscopy and colonoscopy.

## 2024-01-03 NOTE — PATIENT INSTRUCTIONS
Scheduled date of EGD/colonoscopy (as of today): 2/22/24  Physician performing EGD/colonoscopy:Dr. Pastrana  Location of EGD/colonoscopy: [facility]  Desired bowel prep reviewed with patient: [email or web address]  Instructions reviewed with patient by: Olga JEFF  Clearances: deric

## 2024-01-04 DIAGNOSIS — R07.89 ATYPICAL CHEST PAIN: ICD-10-CM

## 2024-01-04 DIAGNOSIS — Z98.890 HISTORY OF CARDIAC RADIOFREQUENCY ABLATION: Primary | ICD-10-CM

## 2024-01-04 DIAGNOSIS — I48.0 PAROXYSMAL ATRIAL FIBRILLATION (HCC): ICD-10-CM

## 2024-01-04 NOTE — TELEPHONE ENCOUNTER
We should see him before clearing because GI would like to do procedure on him and anesthesia will be reluctant to give him sedation if we have not cleared him and he has not done any stress test for long.  Of time.  I would like to have him exercise stress test.  And I am going to order that.

## 2024-01-08 ENCOUNTER — OFFICE VISIT (OUTPATIENT)
Dept: CARDIOLOGY CLINIC | Facility: CLINIC | Age: 54
End: 2024-01-08
Payer: COMMERCIAL

## 2024-01-08 ENCOUNTER — TELEPHONE (OUTPATIENT)
Dept: CARDIOLOGY CLINIC | Facility: CLINIC | Age: 54
End: 2024-01-08

## 2024-01-08 VITALS
WEIGHT: 200 LBS | HEIGHT: 71 IN | OXYGEN SATURATION: 98 % | DIASTOLIC BLOOD PRESSURE: 70 MMHG | SYSTOLIC BLOOD PRESSURE: 120 MMHG | BODY MASS INDEX: 28 KG/M2 | HEART RATE: 74 BPM

## 2024-01-08 DIAGNOSIS — E66.9 OBESITY (BMI 30-39.9): ICD-10-CM

## 2024-01-08 DIAGNOSIS — K62.5 RECTAL BLEEDING: ICD-10-CM

## 2024-01-08 DIAGNOSIS — Z01.810 PREOPERATIVE CARDIOVASCULAR EXAMINATION: ICD-10-CM

## 2024-01-08 DIAGNOSIS — R07.89 ATYPICAL CHEST PAIN: ICD-10-CM

## 2024-01-08 DIAGNOSIS — E78.2 MIXED HYPERLIPIDEMIA: ICD-10-CM

## 2024-01-08 DIAGNOSIS — I48.91 ATRIAL FIBRILLATION, UNSPECIFIED TYPE (HCC): ICD-10-CM

## 2024-01-08 DIAGNOSIS — I48.0 PAROXYSMAL ATRIAL FIBRILLATION (HCC): ICD-10-CM

## 2024-01-08 PROCEDURE — 93000 ELECTROCARDIOGRAM COMPLETE: CPT | Performed by: INTERNAL MEDICINE

## 2024-01-08 PROCEDURE — 99214 OFFICE O/P EST MOD 30 MIN: CPT | Performed by: INTERNAL MEDICINE

## 2024-01-08 RX ORDER — SODIUM CHLORIDE 9 MG/ML
100 INJECTION, SOLUTION INTRAVENOUS CONTINUOUS
OUTPATIENT
Start: 2024-01-08

## 2024-01-08 RX ORDER — ASPIRIN 81 MG/1
324 TABLET, CHEWABLE ORAL ONCE
OUTPATIENT
Start: 2024-01-08 | End: 2024-01-08

## 2024-01-08 RX ORDER — ASPIRIN 81 MG/1
81 TABLET, CHEWABLE ORAL DAILY
Qty: 100 TABLET | Refills: 1 | Status: SHIPPED | OUTPATIENT
Start: 2024-01-08

## 2024-01-08 NOTE — PROGRESS NOTES
Consultation - Cardiology Office  [facility].    Jovi Turpin 53 y.o. male MRN: [id number]  : 1970  Unit/Bed#:  Encounter: [id number]      Assessment:     1. Atypical chest pain    2. Paroxysmal atrial fibrillation (HCC)    3. Mixed hyperlipidemia    4. Obesity (BMI 30-39.9)    5. Preoperative cardiovascular examination    6. Rectal bleeding    7. Atrial fibrillation, unspecified type (HCC)        Discussion summary and Plan:    1. Paroxysmal atrial fibrillation.  Patient is s/p A-fib ablation in [place] in 2021.  Maintaining sinus rhythm.  He has been on metoprolol and Xarelto.  His Zafar vas score is 1.  His risk for stroke is around 1.3%.  He can be managed either with aspirin or Xarelto as per current cholesterol guidelines.  We discussed benefits and risk of each strategy.  I also discussed the option of placing loop recorder to assess his A-fib burden that will help decrease his risk.  He is not having rectal bleeding.  We will implant loop recorder and in the meantime he will switch from Xarelto to aspirin.     2. Hypertension.  Blood pressure is much better as he has lost weight.  Continue metoprolol XL.  He is taking olmesartan half the tablet.  Blood pressure is acceptable.    3. Dyslipidemia.  Recent blood test shows LDL is above the goal.  His risk for cardiovascular event is to 5% we will continue to monitor.    4.  His BMI now around 28 he has lost a lot of weight.    5. Snoring with atrial arrhythmias.  Patient has not done home sleep study.  He is reluctant to do home study at this time.    6.  Atypical chest pain.  His symptoms are mostly GERD like symptoms.  He feels like food stuck there.  He is able to hike and climb mountain without any problem.  He walks 3 to 5 miles and he can run up.  1 to 2 miles without any symptoms.  I believe he is in optimum condition for the EGD and colonoscopy as planned is no cardiac contraindication for the procedure.    7.   History of rectal bleeding management as per GI.  Xarelto will be switched to aspirin    Plan.    Xarelto will be switched to aspirin as his Zafar Vascor is low.  Will place loop recorder.  He is in optimum condition for EGD and colonoscopy as planned.          Patient was advised and educated to call our office  immediately if  patient has any new symptoms of chest pain/shortness of breath, near-syncope, syncope, light headedness sustained palpitations or any other cardiovascular symptoms before their scheduled follow-up appointment.  Office number was provided #[phone].      Thank you for your consultation.  If you have any question please call me at [phone]    Counseling :  A description of the counseling.  Goals and Barriers.  Patient's ability to self care: Yes  Medication side effect reviewed with patient in detail and all their questions answered to their satisfaction.      Primary Care Physician : Frank Lombardi, DO      HPI :     Jovi Turpin is a 53 y.o. year old male who was referred by primary care doctor for history of paroxysmal atrial fibrillation hypertension.  Patient used to live in [place] and had episode of atrial fibrillation at that time which was suppressed with flecainide he moved to down [facility] and started to get later on multiple episodes of atrial fibrillation.  He was evaluated by EP in  [place] and he had EP workup done in the form of ablation in September 2021. Since then he is maintaining sinus rhythm.  Occasionally still get episodes of fast heartbeat.  He has been on Xarelto and metoprolol 50 twice a day.  He was recommended sleep study he has not done it yet.  He did develop erectile dysfunction with beta-blocker has been taking Viagra as needed.  Sometime he misses beta-blocker.  Today he was rushing to our office his heart rate on arrival was found to be elevated around 160 beats per minute.    His previous cardiac workup reviewed.  And note from EP and other note  reviewed.  He had normal LV systolic function.  He had a previously stress echo which was normal and CTA for coronary was normal.  His palpitations which he gets occasionally was thought to be PACs and PVCs and they have ordered monitor for him which he was done recently.  He denies any nausea and vomiting he does snore.  He does not drink much.  No other cardiovascular issues.    3/10/2023.    Above reviewed.  Patient came for follow-up.  He has a medical history significant for paroxysmal atrial fibrillation s/p ablation in [place]  and has been on antithrombotic therapy.  Ablation was done in September 2021.  He is maintaining sinus rhythm.  He has no new complaints.  He has normal LV systolic function.  Previous echo as well as CT of the coronary was normal.  He does not get any palpitations EK today shows sinus them heart rate 74 bpm recently blood pressure was cholesterol was slightly elevated and his risk for cardiovascular event is around 5 to 6%.  He is on dietary control for now.    9/28/2023.    Above reviewed.  Patient came for follow-up he is doing well.  He had lost about 40 pounds.  His blood pressure was low he cut back on his olmesartan.  He was missing on weekend doses today blood pressure is 110/80.  He does not feel dizzy but he feels dizzy when blood pressure goes to 90.  He had A-fib ablation in [place].  Since then he has maintained sinus rhythm.  He has been managed with Xarelto.  His Zafar Vasc score is 1 and his risk for stroke is around 1 to 1.3%.  He can be managed both either with aspirin or Xarelto with risk and benefits of each strategy.  His blood work reveals his cholesterol has improved he feels otherwise well he does get easily anxious and no other cardiovascular issues    1/8/2024.    Above reviewed.  Patient came for follow-up.  He has been losing weight.  Blood pressure is now acceptable.  He was recently in the emergency room with rectal bleeding and was found to have  possible hemorrhoid.  He also have issues in difficulty swallowing and sometimes balance.  When he gets anxious.  He is able to walk and do hiking and he can climb mountain without any issues.  His EKG shows sinus rhythm with heart rate 74 bpm.  Risk for cardiovascular event is low.  No nausea no vomiting no other issues.  He is still taking his Xarelto.  His CH 2A DS 2 vasc score is 1 and his risk of stroke is low.  He does monitor his heart rate by Apple Watch.  He does have A-fib history and ablation.  He is anxious as his father had history of atrial fibrillation but he is in permanent atrial fibrillation and he had stroke as he was not taking blood thinners.  EKG was sinus rhythm heart rate 74 bpm.  Med list reviewed with him.        Review of Systems   Constitutional:  Negative for activity change, chills, diaphoresis, fever and unexpected weight change.   HENT:  Negative for congestion.    Eyes:  Negative for discharge and redness.   Respiratory:  Negative for cough, chest tightness, shortness of breath and wheezing.    Cardiovascular: Negative.  Negative for chest pain, palpitations and leg swelling.         No Palpitation   Gastrointestinal:  Negative for abdominal pain, diarrhea and nausea.        History of rectal bleed   Endocrine: Negative.    Genitourinary:  Negative for decreased urine volume and urgency.   Musculoskeletal: Negative.  Negative for arthralgias, back pain and gait problem.   Skin:  Negative for rash and wound.   Allergic/Immunologic: Negative.    Neurological:  Negative for dizziness, seizures, syncope, weakness, light-headedness and headaches.   Hematological: Negative.    Psychiatric/Behavioral:  Negative for agitation and confusion. The patient is nervous/anxious.        Historical Information   Past Medical History:   Diagnosis Date   • Atrial fibrillation (HCC)    • HTN (hypertension)      Past Surgical History:   Procedure Laterality Date   • CARDIAC ELECTROPHYSIOLOGY STUDY AND  "ABLATION     • CLAVICLE SURGERY Left    • COLONOSCOPY  2020     Social History     Substance and Sexual Activity   Alcohol Use Yes   • Alcohol/week: 1.0 standard drink of alcohol   • Types: 1 Cans of beer per week    Comment: few weekly     Social History     Substance and Sexual Activity   Drug Use Never     Social History     Tobacco Use   Smoking Status Former   • Current packs/day: 0.00   • Average packs/day: 0.5 packs/day for 5.0 years (2.5 ttl pk-yrs)   • Types: Cigarettes   • Start date:    • Quit date:    • Years since quittin.0   Smokeless Tobacco Never     Family History:   Family History   Problem Relation Age of Onset   • Atrial fibrillation Father    • Colonic polyp Father        Meds/Allergies     No Known Allergies    Current Outpatient Medications:   •  aspirin 81 mg chewable tablet, Chew 1 tablet (81 mg total) daily, Disp: 100 tablet, Rfl: 1  •  hydrocortisone (ANUSOL-HC) 2.5 % rectal cream, Apply topically 2 (two) times a day, Disp: 28 g, Rfl: 0  •  metoprolol tartrate (LOPRESSOR) 50 mg tablet, TAKE ONE TABLET BY MOUTH TWICE A DAY, Disp: 180 tablet, Rfl: 0  •  pantoprazole (PROTONIX) 40 mg tablet, Take 1 tablet (40 mg total) by mouth daily, Disp: 30 tablet, Rfl: 6  •  sildenafil (VIAGRA) 100 mg tablet, TAKE ONE TABLET BY MOUTH AS NEEDED, Disp: 6 tablet, Rfl: 0    Vitals: Blood pressure 120/70, pulse 74, height 5' 11\" (1.803 m), weight 90.7 kg (200 lb), SpO2 98%.  ?  Body mass index is 27.89 kg/m².  Wt Readings from Last 3 Encounters:   24 90.7 kg (200 lb)   24 90.7 kg (200 lb)   23 90.9 kg (200 lb 4.8 oz)     Vitals:    24 1133   Weight: 90.7 kg (200 lb)       BP Readings from Last 3 Encounters:   24 120/70   24 120/86   24 112/79         Physical Exam  Constitutional:       General: He is not in acute distress.     Appearance: He is well-developed. He is not diaphoretic.   Neck:      Thyroid: No thyromegaly.      Vascular: No JVD. "   Cardiovascular:      Rate and Rhythm: Normal rate and regular rhythm.      Pulses: Normal pulses.      Heart sounds: Normal heart sounds.   Pulmonary:      Effort: Pulmonary effort is normal. No respiratory distress.      Breath sounds: Normal breath sounds. No wheezing or rales.   Abdominal:      General: There is no distension.      Palpations: Abdomen is soft.      Tenderness: There is no abdominal tenderness. There is no rebound.   Musculoskeletal:         General: No tenderness. Normal range of motion.      Cervical back: Normal range of motion and neck supple.   Skin:     General: Skin is warm and dry.   Neurological:      Mental Status: He is alert and oriented to person, place, and time.   Psychiatric:         Behavior: Behavior normal.         Judgment: Judgment normal.           Diagnostic Studies Review Cardio:  Echocardiogram done in [facility] on 07/19/2021 was read as EF 65%.  With exercise EF improved to more than 70%.  Low risk treadmill exercise echocardiogram as per report.    NATALIE done 09/08/2021 shows patient has no left atrial appendage thrombus.  EF was normal.  Interatrial septum was intact.  No significant valvular disease was mention and ascending and descending aorta was free from atherosclerosis as per report.    Transthoracic echo 09/08/2021 shows EF 65%, no significant valvular disease.    EKG:  Twelve lead EKG done 09/09/2022 shows sinus tachycardia heart rate 116 beats per minute.  As per patient this is generally ablation as he was running late and he was running to our office generally heart rate is around 80 to 90s.    Twelve-lead EKG 3/10/2023 shows normal sinus some heart rate 74 bpm it is a normal EKG.    Twelve-lead EKG done on 9/28/2023 shows sinus rhythm with a heart rate 83 bpm no other significant normality.    Twelve-lead EKG done on 1/8/2024 shows normal sinus rhythm with a heart rate 74 bpm no other significant abnormality.    Lab Review   Lab Results  "  Component Value Date    WBC 5.53 01/01/2024    HGB 14.7 01/01/2024    HCT 43.5 01/01/2024    MCV 90 01/01/2024    RDW 13.5 01/01/2024     01/01/2024     BMP:  Lab Results   Component Value Date    SODIUM 137 01/01/2024    K 4.1 01/01/2024     01/01/2024    CO2 26 01/01/2024    BUN 20 01/01/2024    CREATININE 0.97 01/01/2024    GLUC 105 01/01/2024    CALCIUM 8.5 01/01/2024    EGFR 88 01/01/2024     Troponins:    LFT:  Lab Results   Component Value Date    AST 14 12/31/2023    ALT 16 12/31/2023    ALKPHOS 41 12/31/2023    TP 6.6 12/31/2023    ALB 4.1 12/31/2023      Lab Results   Component Value Date    BHF4PQNPXJDG 1.770 03/18/2016     Lipid Profile:   Lab Results   Component Value Date    CHOLESTEROL 185 08/10/2023    HDL 54 08/10/2023    LDLCALC 117 (H) 08/10/2023    TRIG 78 08/10/2023     The 10-year ASCVD risk score (Zaynab DK, et al., 2019) is: 4.1%    Values used to calculate the score:      Age: 53 years      Sex: Male      Is Non- : No      Diabetic: No      Tobacco smoker: No      Systolic Blood Pressure: 120 mmHg      Is BP treated: Yes      HDL Cholesterol: 54 mg/dL      Total Cholesterol: 185 mg/dL      Dr. Jack Rudd MD [facility]      \"This note was completed in part utilizing m-modal fluency direct voice recognition software.   Grammatical errors, random word insertion, spelling mistakes, and incomplete sentences may be an occasional consequence of the system secondary to software limitations, ambient noise and hardware issues.    Please read the chart carefully and recognize, using context, where substitutions have occurred.  If you have any questions or concerns about the context, text or information contained within the body of this dictation, please contact myself, the provider, for further clarification.\"  "

## 2024-01-10 NOTE — TELEPHONE ENCOUNTER
Per office note, patient no longer on xarelto. Patient is now taking aspirin. Patient is cleared for procedures.

## 2024-01-11 ENCOUNTER — TELEPHONE (OUTPATIENT)
Dept: GASTROENTEROLOGY | Facility: CLINIC | Age: 54
End: 2024-01-11

## 2024-01-11 NOTE — TELEPHONE ENCOUNTER
----- Message from Yesy Esquivel MD sent at 1/1/2024 11:16 AM EST -----  HI can we please schedule outpatient follow up for hospital discharge for hematochezia. Thanks!

## 2024-01-25 ENCOUNTER — TELEPHONE (OUTPATIENT)
Age: 54
End: 2024-01-25

## 2024-01-25 DIAGNOSIS — E78.2 MIXED HYPERLIPIDEMIA: ICD-10-CM

## 2024-01-25 DIAGNOSIS — I10 BENIGN ESSENTIAL HYPERTENSION: Primary | ICD-10-CM

## 2024-01-25 RX ORDER — OLMESARTAN MEDOXOMIL 5 MG/1
20 TABLET ORAL DAILY
Qty: 90 TABLET | Refills: 3 | Status: SHIPPED | OUTPATIENT
Start: 2024-01-25 | End: 2024-01-26 | Stop reason: DRUGHIGH

## 2024-01-25 NOTE — TELEPHONE ENCOUNTER
Pt called to say he tested positive for Covid on 1/22/24. Pt c/o head congestion, fever (stopped yesterday), abdominal pain and a cough for the past four days. Pts O2 has been steady at 99. Pt wants to know if there is anything OTC he should be doing? Should he be on Paxlovid? If Paxlovid is prescribed he wants to be sure he can take it with his current meds. Pt states he is also taking olmesartan even though it is not on his med list. Pt declined an appt unless required. CDC isolation/masking guidelines given.  Please advise.

## 2024-01-25 NOTE — TELEPHONE ENCOUNTER
Feeling better than 4 days ago. Recommended vit d, vit c and zinc. Mucinex could help.  Nancy Thornton

## 2024-01-25 NOTE — TELEPHONE ENCOUNTER
That is appropriate advice.  Could also do dayquil and nyquil as needed.    Unless he is quite sick I do not think paxlovid is necessary

## 2024-01-26 DIAGNOSIS — I48.0 PAROXYSMAL ATRIAL FIBRILLATION (HCC): ICD-10-CM

## 2024-01-26 DIAGNOSIS — I10 BENIGN ESSENTIAL HYPERTENSION: Primary | ICD-10-CM

## 2024-01-26 RX ORDER — OLMESARTAN MEDOXOMIL 20 MG/1
20 TABLET ORAL DAILY
Qty: 90 TABLET | Refills: 3 | Status: SHIPPED | OUTPATIENT
Start: 2024-01-26

## 2024-01-26 NOTE — TELEPHONE ENCOUNTER
Pt.called to report the Olmesartan 5 mgwas sent to pharmacy yesterday.   Pt.didn't  from pharmacy, however.  He is requesting the Olmesartan 20 mg one tablet daily.

## 2024-02-09 ENCOUNTER — ANESTHESIA EVENT (OUTPATIENT)
Dept: ANESTHESIOLOGY | Facility: HOSPITAL | Age: 54
End: 2024-02-09

## 2024-02-09 ENCOUNTER — ANESTHESIA (OUTPATIENT)
Dept: ANESTHESIOLOGY | Facility: HOSPITAL | Age: 54
End: 2024-02-09

## 2024-02-20 ENCOUNTER — NURSE TRIAGE (OUTPATIENT)
Age: 54
End: 2024-02-20

## 2024-02-20 NOTE — TELEPHONE ENCOUNTER
"Pt calling in, reports his cardiologist took him off xarelto a few weeks ago and he is now on aspirin 81 mg daily. He has EGD/colonoscopy this week, I advised him it was okay to continue. He understood no further questions.   Reason for Disposition   Information only question and nurse able to answer    Answer Assessment - Initial Assessment Questions  1. REASON FOR CALL or QUESTION: \"What is your reason for calling today?\" or \"How can I best help you?\" or \"What question do you have that I can help answer?\"      Okay to continue aspirin before procedures    Protocols used: Information Only Call - No Triage-ADULT-[place]    "

## 2024-02-22 ENCOUNTER — ANESTHESIA (OUTPATIENT)
Dept: GASTROENTEROLOGY | Facility: AMBULARY SURGERY CENTER | Age: 54
End: 2024-02-22

## 2024-02-22 ENCOUNTER — ANESTHESIA EVENT (OUTPATIENT)
Dept: GASTROENTEROLOGY | Facility: AMBULARY SURGERY CENTER | Age: 54
End: 2024-02-22

## 2024-02-22 ENCOUNTER — HOSPITAL ENCOUNTER (OUTPATIENT)
Dept: GASTROENTEROLOGY | Facility: AMBULARY SURGERY CENTER | Age: 54
Setting detail: OUTPATIENT SURGERY
End: 2024-02-22
Attending: INTERNAL MEDICINE
Payer: COMMERCIAL

## 2024-02-22 VITALS
OXYGEN SATURATION: 99 % | DIASTOLIC BLOOD PRESSURE: 74 MMHG | HEART RATE: 62 BPM | TEMPERATURE: 97.1 F | RESPIRATION RATE: 18 BRPM | SYSTOLIC BLOOD PRESSURE: 112 MMHG

## 2024-02-22 DIAGNOSIS — K21.9 GASTROESOPHAGEAL REFLUX DISEASE WITHOUT ESOPHAGITIS: ICD-10-CM

## 2024-02-22 DIAGNOSIS — K62.5 BRIGHT RED BLOOD PER RECTUM: ICD-10-CM

## 2024-02-22 PROCEDURE — 88305 TISSUE EXAM BY PATHOLOGIST: CPT | Performed by: STUDENT IN AN ORGANIZED HEALTH CARE EDUCATION/TRAINING PROGRAM

## 2024-02-22 RX ORDER — SODIUM CHLORIDE, SODIUM LACTATE, POTASSIUM CHLORIDE, CALCIUM CHLORIDE 600; 310; 30; 20 MG/100ML; MG/100ML; MG/100ML; MG/100ML
125 INJECTION, SOLUTION INTRAVENOUS CONTINUOUS
Status: DISCONTINUED | OUTPATIENT
Start: 2024-02-22 | End: 2024-02-26 | Stop reason: HOSPADM

## 2024-02-22 RX ORDER — PROPOFOL 10 MG/ML
INJECTION, EMULSION INTRAVENOUS AS NEEDED
Status: DISCONTINUED | OUTPATIENT
Start: 2024-02-22 | End: 2024-02-22

## 2024-02-22 RX ORDER — LIDOCAINE HYDROCHLORIDE 10 MG/ML
INJECTION, SOLUTION EPIDURAL; INFILTRATION; INTRACAUDAL; PERINEURAL AS NEEDED
Status: DISCONTINUED | OUTPATIENT
Start: 2024-02-22 | End: 2024-02-22

## 2024-02-22 RX ORDER — PROPOFOL 10 MG/ML
INJECTION, EMULSION INTRAVENOUS CONTINUOUS PRN
Status: DISCONTINUED | OUTPATIENT
Start: 2024-02-22 | End: 2024-02-22

## 2024-02-22 RX ADMIN — SODIUM CHLORIDE, SODIUM LACTATE, POTASSIUM CHLORIDE, AND CALCIUM CHLORIDE 125 ML/HR: .6; .31; .03; .02 INJECTION, SOLUTION INTRAVENOUS at 10:17

## 2024-02-22 RX ADMIN — PROPOFOL 150 MG: 10 INJECTION, EMULSION INTRAVENOUS at 10:47

## 2024-02-22 RX ADMIN — PROPOFOL 40 MG: 10 INJECTION, EMULSION INTRAVENOUS at 10:56

## 2024-02-22 RX ADMIN — PROPOFOL 120 MCG/KG/MIN: 10 INJECTION, EMULSION INTRAVENOUS at 10:51

## 2024-02-22 RX ADMIN — PROPOFOL 150 MG: 10 INJECTION, EMULSION INTRAVENOUS at 10:45

## 2024-02-22 RX ADMIN — LIDOCAINE HYDROCHLORIDE 5 ML: 10 INJECTION, SOLUTION EPIDURAL; INFILTRATION; INTRACAUDAL; PERINEURAL at 10:45

## 2024-02-22 NOTE — ANESTHESIA PREPROCEDURE EVALUATION
Procedure:  COLONOSCOPY  EGD    Relevant Problems   CARDIO   (+) Atrial fibrillation (HCC)   (+) Benign essential hypertension   (+) Mixed hyperlipidemia      GI/HEPATIC   (+) Bright red blood per rectum   (+) Dysphagia   (+) Esophageal reflux      /RENAL   (+) Chronic prostatitis             Anesthesia Plan  ASA Score- 2     Anesthesia Type- IV sedation with anesthesia with ASA Monitors.         Additional Monitors:     Airway Plan:            Plan Factors-    Chart reviewed.                      Induction- intravenous.    Postoperative Plan-     Informed Consent- Anesthetic plan and risks discussed with patient.  I personally reviewed this patient with the CRNA. Discussed and agreed on the Anesthesia Plan with the CRNA..

## 2024-02-22 NOTE — ANESTHESIA POSTPROCEDURE EVALUATION
Post-Op Assessment Note    CV Status:  Stable         Mental Status:  Sleepy   Hydration Status:  Stable   PONV Controlled:  Controlled   Airway Patency:  Patent     Post Op Vitals Reviewed: Yes    No anethesia notable event occurred.    Staff: CRNA               BP   90/50   Temp      Pulse  76   Resp   20   SpO2   98

## 2024-02-22 NOTE — H&P
History and Physical -  [facility]  Jovi Turpin 54 y.o. male MRN: [id number]    HPI: Jovi Turpin is a 54 y.o. year old male who presents with atypical chest pain, dysphagia, rectal bleeding.       Review of Systems    Historical Information   Past Medical History:   Diagnosis Date    Atrial fibrillation (HCC)     HTN (hypertension)     Irregular heart beat      Past Surgical History:   Procedure Laterality Date    CARDIAC ELECTROPHYSIOLOGY STUDY AND ABLATION      CLAVICLE SURGERY Left     COLONOSCOPY  2020    FRACTURE SURGERY  2013    Left Clavicle     Social History   Social History     Substance and Sexual Activity   Alcohol Use Yes    Alcohol/week: 1.0 standard drink of alcohol    Types: 1 Cans of beer per week    Comment: few weekly     Social History     Substance and Sexual Activity   Drug Use Never     Social History     Tobacco Use   Smoking Status Former    Current packs/day: 0.00    Average packs/day: 0.5 packs/day for 5.0 years (2.5 ttl pk-yrs)    Types: Cigarettes    Start date:     Quit date:     Years since quittin.1   Smokeless Tobacco Never     Family History   Problem Relation Age of Onset    Atrial fibrillation Father     Colonic polyp Father        Meds/Allergies     (Not in a hospital admission)      No Known Allergies    Objective     /87   Pulse 74   Temp (!) 97.1 °F (36.2 °C) (Temporal)   Resp 16   SpO2 99%       PHYSICAL EXAM    Gen: NAD  CV: RRR  CHEST: Clear  ABD: soft, NT/ND  EXT: no edema  Neuro: AAO      ASSESSMENT/PLAN:  This is a 54 y.o. year old male here for  atypical chest pain, dysphagia, rectal bleeding.     PLAN:   Procedure: egd/colonoscopy

## 2024-02-28 ENCOUNTER — TELEPHONE (OUTPATIENT)
Dept: CARDIOLOGY CLINIC | Facility: CLINIC | Age: 54
End: 2024-02-28

## 2024-02-28 NOTE — TELEPHONE ENCOUNTER
Xarelto has no effect on blood pressure.  It is a blood thinner it has nothing to do with the blood pressure.  He may need adjustment of his blood pressure medications.  Or it may be driven by anxiety.

## 2024-02-28 NOTE — TELEPHONE ENCOUNTER
I called patient, made aware of above. He will monitor bp and call back on Friday for possible further instruction.

## 2024-02-29 PROCEDURE — 88305 TISSUE EXAM BY PATHOLOGIST: CPT | Performed by: STUDENT IN AN ORGANIZED HEALTH CARE EDUCATION/TRAINING PROGRAM

## 2024-03-06 ENCOUNTER — TELEPHONE (OUTPATIENT)
Dept: CARDIOLOGY CLINIC | Facility: CLINIC | Age: 54
End: 2024-03-06

## 2024-03-06 NOTE — TELEPHONE ENCOUNTER
Dr Rudd's pt-  He wants to cancel the loop recorder implant procedure currently scheduled for March 13 and does not wish to reschedule at this time.

## 2024-04-17 DIAGNOSIS — I10 HYPERTENSION, UNSPECIFIED TYPE: ICD-10-CM

## 2024-04-17 DIAGNOSIS — N52.9 ERECTILE DYSFUNCTION, UNSPECIFIED ERECTILE DYSFUNCTION TYPE: ICD-10-CM

## 2024-04-17 DIAGNOSIS — I48.91 ATRIAL FIBRILLATION, UNSPECIFIED TYPE (HCC): ICD-10-CM

## 2024-04-18 RX ORDER — SILDENAFIL 100 MG/1
TABLET, FILM COATED ORAL
Qty: 6 TABLET | Refills: 2 | Status: SHIPPED | OUTPATIENT
Start: 2024-04-18

## 2024-04-18 RX ORDER — METOPROLOL TARTRATE 50 MG/1
50 TABLET, FILM COATED ORAL 2 TIMES DAILY
Qty: 180 TABLET | Refills: 1 | Status: SHIPPED | OUTPATIENT
Start: 2024-04-18

## 2024-10-17 DIAGNOSIS — I48.91 ATRIAL FIBRILLATION, UNSPECIFIED TYPE (HCC): ICD-10-CM

## 2024-10-17 DIAGNOSIS — I48.0 PAROXYSMAL ATRIAL FIBRILLATION (HCC): ICD-10-CM

## 2024-10-17 DIAGNOSIS — I10 BENIGN ESSENTIAL HYPERTENSION: ICD-10-CM

## 2024-10-17 DIAGNOSIS — I10 HYPERTENSION, UNSPECIFIED TYPE: ICD-10-CM

## 2024-10-17 RX ORDER — OLMESARTAN MEDOXOMIL 20 MG/1
20 TABLET ORAL DAILY
Qty: 90 TABLET | Refills: 2 | Status: SHIPPED | OUTPATIENT
Start: 2024-10-17

## 2024-10-17 RX ORDER — METOPROLOL TARTRATE 50 MG
50 TABLET ORAL 2 TIMES DAILY
Qty: 180 TABLET | Refills: 0 | Status: SHIPPED | OUTPATIENT
Start: 2024-10-17

## 2025-01-09 DIAGNOSIS — I10 HYPERTENSION, UNSPECIFIED TYPE: ICD-10-CM

## 2025-01-09 DIAGNOSIS — I48.91 ATRIAL FIBRILLATION, UNSPECIFIED TYPE (HCC): ICD-10-CM

## 2025-01-13 RX ORDER — METOPROLOL TARTRATE 50 MG
50 TABLET ORAL 2 TIMES DAILY
Qty: 180 TABLET | Refills: 0 | Status: SHIPPED | OUTPATIENT
Start: 2025-01-13

## 2025-03-24 DIAGNOSIS — N52.9 ERECTILE DYSFUNCTION, UNSPECIFIED ERECTILE DYSFUNCTION TYPE: ICD-10-CM

## 2025-03-26 RX ORDER — SILDENAFIL 100 MG/1
100 TABLET, FILM COATED ORAL AS NEEDED
Qty: 6 TABLET | Refills: 1 | Status: SHIPPED | OUTPATIENT
Start: 2025-03-26

## 2025-07-01 DIAGNOSIS — I48.91 ATRIAL FIBRILLATION, UNSPECIFIED TYPE (HCC): ICD-10-CM

## 2025-07-01 DIAGNOSIS — I10 HYPERTENSION, UNSPECIFIED TYPE: ICD-10-CM

## 2025-07-01 DIAGNOSIS — I10 BENIGN ESSENTIAL HYPERTENSION: ICD-10-CM

## 2025-07-01 DIAGNOSIS — I48.0 PAROXYSMAL ATRIAL FIBRILLATION (HCC): ICD-10-CM

## 2025-07-02 RX ORDER — OLMESARTAN MEDOXOMIL 20 MG/1
20 TABLET ORAL DAILY
Qty: 90 TABLET | Refills: 2 | Status: SHIPPED | OUTPATIENT
Start: 2025-07-02

## 2025-07-02 RX ORDER — METOPROLOL TARTRATE 50 MG
50 TABLET ORAL 2 TIMES DAILY
Qty: 180 TABLET | Refills: 0 | Status: SHIPPED | OUTPATIENT
Start: 2025-07-02

## 2025-08-19 ENCOUNTER — OFFICE VISIT (OUTPATIENT)
Dept: CARDIOLOGY CLINIC | Facility: CLINIC | Age: 55
End: 2025-08-19
Payer: COMMERCIAL

## 2025-08-19 VITALS
HEIGHT: 71 IN | BODY MASS INDEX: 28.28 KG/M2 | OXYGEN SATURATION: 99 % | DIASTOLIC BLOOD PRESSURE: 80 MMHG | WEIGHT: 202 LBS | HEART RATE: 59 BPM | SYSTOLIC BLOOD PRESSURE: 128 MMHG

## 2025-08-19 DIAGNOSIS — I10 BENIGN ESSENTIAL HYPERTENSION: ICD-10-CM

## 2025-08-19 DIAGNOSIS — E78.2 MIXED HYPERLIPIDEMIA: ICD-10-CM

## 2025-08-19 DIAGNOSIS — Z98.890 HISTORY OF CARDIAC RADIOFREQUENCY ABLATION: ICD-10-CM

## 2025-08-19 DIAGNOSIS — I48.0 PAROXYSMAL ATRIAL FIBRILLATION (HCC): ICD-10-CM

## 2025-08-19 DIAGNOSIS — E66.9 OBESITY (BMI 30-39.9): ICD-10-CM

## 2025-08-19 PROCEDURE — 93000 ELECTROCARDIOGRAM COMPLETE: CPT | Performed by: INTERNAL MEDICINE

## 2025-08-19 PROCEDURE — 99214 OFFICE O/P EST MOD 30 MIN: CPT | Performed by: INTERNAL MEDICINE

## 2025-08-19 RX ORDER — ASPIRIN 81 MG/1
81 TABLET, CHEWABLE ORAL DAILY
Qty: 100 TABLET | Refills: 1 | COMMUNITY
Start: 2025-08-19 | End: 2025-08-19